# Patient Record
Sex: FEMALE | Race: WHITE | HISPANIC OR LATINO | Employment: PART TIME | URBAN - METROPOLITAN AREA
[De-identification: names, ages, dates, MRNs, and addresses within clinical notes are randomized per-mention and may not be internally consistent; named-entity substitution may affect disease eponyms.]

---

## 2019-08-26 ENCOUNTER — HOSPITAL ENCOUNTER (EMERGENCY)
Facility: HOSPITAL | Age: 34
Discharge: HOME | End: 2019-08-26
Attending: EMERGENCY MEDICINE | Admitting: EMERGENCY MEDICINE
Payer: COMMERCIAL

## 2019-08-26 ENCOUNTER — APPOINTMENT (EMERGENCY)
Dept: CT IMAGING | Facility: HOSPITAL | Age: 34
End: 2019-08-26
Payer: COMMERCIAL

## 2019-08-26 ENCOUNTER — APPOINTMENT (EMERGENCY)
Dept: RADIOLOGY | Facility: HOSPITAL | Age: 34
End: 2019-08-26
Payer: COMMERCIAL

## 2019-08-26 VITALS
BODY MASS INDEX: 39.93 KG/M2 | HEART RATE: 81 BPM | SYSTOLIC BLOOD PRESSURE: 114 MMHG | DIASTOLIC BLOOD PRESSURE: 62 MMHG | WEIGHT: 217 LBS | TEMPERATURE: 99.2 F | OXYGEN SATURATION: 97 % | RESPIRATION RATE: 16 BRPM | HEIGHT: 62 IN

## 2019-08-26 DIAGNOSIS — M54.12 CERVICAL RADICULOPATHY: ICD-10-CM

## 2019-08-26 DIAGNOSIS — S09.90XA CLOSED HEAD INJURY, INITIAL ENCOUNTER: Primary | ICD-10-CM

## 2019-08-26 DIAGNOSIS — S16.1XXA STRAIN OF NECK MUSCLE, INITIAL ENCOUNTER: ICD-10-CM

## 2019-08-26 PROCEDURE — 71046 X-RAY EXAM CHEST 2 VIEWS: CPT

## 2019-08-26 PROCEDURE — 72125 CT NECK SPINE W/O DYE: CPT

## 2019-08-26 PROCEDURE — 93005 ELECTROCARDIOGRAM TRACING: CPT

## 2019-08-26 PROCEDURE — 99283 EMERGENCY DEPT VISIT LOW MDM: CPT | Performed by: NURSE PRACTITIONER

## 2019-08-26 PROCEDURE — 99284 EMERGENCY DEPT VISIT MOD MDM: CPT

## 2019-08-26 RX ORDER — CYCLOBENZAPRINE HCL 10 MG
10 TABLET ORAL 3 TIMES DAILY PRN
Qty: 30 TABLET | Refills: 0 | Status: SHIPPED | OUTPATIENT
Start: 2019-08-26 | End: 2019-08-26 | Stop reason: SDUPTHER

## 2019-08-26 RX ORDER — METHYLPREDNISOLONE 4 MG/1
TABLET ORAL
Qty: 21 TABLET | Refills: 0 | Status: SHIPPED | OUTPATIENT
Start: 2019-08-26 | End: 2021-02-04

## 2019-08-26 RX ORDER — CYCLOBENZAPRINE HCL 10 MG
10 TABLET ORAL 3 TIMES DAILY PRN
Qty: 21 TABLET | Refills: 0 | Status: SHIPPED | OUTPATIENT
Start: 2019-08-26 | End: 2019-09-02

## 2019-08-26 RX ORDER — METHYLPREDNISOLONE 4 MG/1
TABLET ORAL
Qty: 21 TABLET | Refills: 0 | Status: SHIPPED | OUTPATIENT
Start: 2019-08-26 | End: 2019-08-26 | Stop reason: SDUPTHER

## 2019-08-26 NOTE — ED PROVIDER NOTES
History  Chief Complaint   Patient presents with   Trip Ceron Fall     fell down some stairs few days ago, c/o chest pain     53-year-old female presents here after having 2 days of neck pain after a fall  She reports she tripped on the steps and dove over a box she was carrying striking her head at the bottom of the stairway putting hole in the wall  This was an axial load injury  She has tenderness over the left trapezius but no focal midline cervical tenderness  She has some left upper extremity radiculopathy  She reports she may have lost consciousness at the time  She has ecchymosis noted over the left forehead area with some gravitational pooling underneath the left eye  She denies any headache no nausea no vomiting  Denies any dizziness  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  History provided by:  Parent  Fall   Associated symptoms: neck pain    Associated symptoms: no abdominal pain, no headaches, no nausea and no vomiting        None       History reviewed  No pertinent past medical history  Past Surgical History:   Procedure Laterality Date    CHOLECYSTECTOMY         History reviewed  No pertinent family history  I have reviewed and agree with the history as documented  Social History     Tobacco Use    Smoking status: Never Smoker    Smokeless tobacco: Never Used   Substance Use Topics    Alcohol use: Never     Frequency: Never    Drug use: Never        Review of Systems   Constitutional: Negative for activity change, fatigue and fever  HENT: Negative for congestion, ear pain, rhinorrhea and sore throat  Eyes: Negative  Respiratory: Negative for cough, shortness of breath and wheezing  Gastrointestinal: Negative for abdominal pain, diarrhea, nausea and vomiting  Endocrine: Negative      Genitourinary: Negative for difficulty urinating, dyspareunia, dysuria, flank pain, frequency, menstrual problem, pelvic pain, urgency, vaginal bleeding, vaginal discharge and vaginal pain  Musculoskeletal: Positive for neck pain  Negative for arthralgias and myalgias  Skin: Negative for color change and pallor  Neurological: Negative for dizziness, speech difficulty, weakness and headaches  Hematological: Negative for adenopathy  Psychiatric/Behavioral: Negative for confusion  Physical Exam  Physical Exam   Constitutional: She is oriented to person, place, and time  She appears well-developed and well-nourished  She is cooperative  Non-toxic appearance  She does not have a sickly appearance  She does not appear ill  No distress  HENT:   Head: Normocephalic and atraumatic  Right Ear: Tympanic membrane and external ear normal    Left Ear: Tympanic membrane and external ear normal    Nose: No rhinorrhea, sinus tenderness or nasal deformity  No epistaxis  Right sinus exhibits no maxillary sinus tenderness and no frontal sinus tenderness  Left sinus exhibits no maxillary sinus tenderness and no frontal sinus tenderness  Mouth/Throat: Oropharynx is clear and moist and mucous membranes are normal  Normal dentition  Eyes: Pupils are equal, round, and reactive to light  EOM are normal    Neck: Normal range of motion  Neck supple  Cardiovascular: Normal rate, regular rhythm and normal heart sounds  No murmur heard  Pulmonary/Chest: Effort normal and breath sounds normal  No accessory muscle usage  No respiratory distress  She has no wheezes  She has no rales  She exhibits no tenderness  Abdominal: Soft  She exhibits no distension  There is no guarding  Musculoskeletal: Normal range of motion  She exhibits no edema or tenderness  Lymphadenopathy:     She has no cervical adenopathy  Neurological: She is alert and oriented to person, place, and time  She exhibits normal muscle tone  Skin: Skin is warm and dry  No rash noted  No erythema  Psychiatric: She has a normal mood and affect     Nursing note and vitals reviewed  Vital Signs  ED Triage Vitals   Temperature Pulse Respirations Blood Pressure SpO2   08/26/19 1325 08/26/19 1325 08/26/19 1325 08/26/19 1327 08/26/19 1415   99 2 °F (37 3 °C) 95 18 112/77 98 %      Temp src Heart Rate Source Patient Position - Orthostatic VS BP Location FiO2 (%)   -- 08/26/19 1415 08/26/19 1415 08/26/19 1415 --    Monitor Lying Right arm       Pain Score       08/26/19 1325       Worst Possible Pain           Vitals:    08/26/19 1415 08/26/19 1515 08/26/19 1530 08/26/19 1615   BP: 126/73 113/59 113/59 114/62   Pulse: 68 78 78 81   Patient Position - Orthostatic VS: Lying Lying Lying Lying         Visual Acuity      ED Medications  Medications - No data to display    Diagnostic Studies  Results Reviewed     None                 XR chest 2 views   Final Result by Andressa Wilson MD (08/26 1626)      No acute cardiopulmonary disease  Workstation performed: [id number]         CT cervical spine without contrast   Final Result by Robina Irene MD (08/26 1550)      No cervical spine fracture or traumatic malalignment                     Workstation performed: [id number]                    Procedures  Procedures       ED Course                               MDM  Number of Diagnoses or Management Options  Cervical radiculopathy: new and requires workup  Closed head injury, initial encounter: new and requires workup  Strain of neck muscle, initial encounter: new and requires workup     Amount and/or Complexity of Data Reviewed  Tests in the radiology section of CPT®: reviewed and ordered  Independent visualization of images, tracings, or specimens: yes    Patient Progress  Patient progress: stable      Disposition  Final diagnoses:   Closed head injury, initial encounter   Strain of neck muscle, initial encounter   Cervical radiculopathy     Time reflects when diagnosis was documented in both MDM as applicable and the Disposition within this note     Time User Action Codes Description Comment    8/26/2019  3:35 PM Mamadou Thomas [S09 90XA] Closed head injury, initial encounter     8/26/2019  3:35 PM Mamadou Thomas [S16  1XXA] Strain of neck muscle, initial encounter     8/26/2019  4:30 PM Mamadou Thomas [Q95 94] Cervical radiculopathy       ED Disposition     ED Disposition Condition Date/Time Comment    Discharge Stable Mon Aug 26, 2019  3:35 PM [facility] discharge to home/self care              Follow-up Information     Follow up With Specialties Details Why Contact Info    [facility] Physical Therapy Schedule an appointment as soon as possible for a visit  For Continued Evaluation [phone]          Discharge Medication List as of 8/26/2019  4:32 PM      START taking these medications    Details   cyclobenzaprine (FLEXERIL) 10 mg tablet Take 1 tablet (10 mg total) by mouth 3 (three) times a day as needed for muscle spasms for up to 7 days, Starting Mon 8/26/2019, Until Mon 9/2/2019, Print      methylPREDNISolone (MEDROL) 4 MG tablet therapy pack Use as directed on package, Print               ED Provider  Electronically Signed by           SON Oropeza  08/26/19 0625

## 2019-08-27 LAB
ATRIAL RATE: 70 BPM
P AXIS: 23 DEGREES
PR INTERVAL: 126 MS
QRS AXIS: 14 DEGREES
QRSD INTERVAL: 78 MS
QT INTERVAL: 380 MS
QTC INTERVAL: 410 MS
T WAVE AXIS: 0 DEGREES
VENTRICULAR RATE: 70 BPM

## 2019-08-27 PROCEDURE — 93010 ELECTROCARDIOGRAM REPORT: CPT | Performed by: INTERNAL MEDICINE

## 2019-08-28 LAB
ATRIAL RATE: 68 BPM
P AXIS: 20 DEGREES
PR INTERVAL: 124 MS
QRS AXIS: 16 DEGREES
QRSD INTERVAL: 76 MS
QT INTERVAL: 382 MS
QTC INTERVAL: 406 MS
T WAVE AXIS: 4 DEGREES
VENTRICULAR RATE: 68 BPM

## 2019-08-28 PROCEDURE — 93010 ELECTROCARDIOGRAM REPORT: CPT | Performed by: INTERNAL MEDICINE

## 2019-08-29 ENCOUNTER — TELEPHONE (OUTPATIENT)
Dept: PHYSICAL THERAPY | Facility: OTHER | Age: 34
End: 2019-08-29

## 2019-08-29 NOTE — TELEPHONE ENCOUNTER
Voice mail/message left for patient to return call to [place] [id number] program including our hours of business and phone number       Referral closed

## 2021-02-04 ENCOUNTER — PREP FOR PROCEDURE (OUTPATIENT)
Dept: GASTROENTEROLOGY | Facility: CLINIC | Age: 36
End: 2021-02-04

## 2021-02-04 ENCOUNTER — OFFICE VISIT (OUTPATIENT)
Dept: GASTROENTEROLOGY | Facility: CLINIC | Age: 36
End: 2021-02-04
Payer: COMMERCIAL

## 2021-02-04 VITALS
SYSTOLIC BLOOD PRESSURE: 120 MMHG | BODY MASS INDEX: 42.36 KG/M2 | DIASTOLIC BLOOD PRESSURE: 88 MMHG | HEIGHT: 62 IN | HEART RATE: 77 BPM | WEIGHT: 230.2 LBS

## 2021-02-04 DIAGNOSIS — R11.2 NAUSEA AND VOMITING, INTRACTABILITY OF VOMITING NOT SPECIFIED, UNSPECIFIED VOMITING TYPE: Primary | ICD-10-CM

## 2021-02-04 DIAGNOSIS — E66.9 OBESITY, UNSPECIFIED CLASSIFICATION, UNSPECIFIED OBESITY TYPE, UNSPECIFIED WHETHER SERIOUS COMORBIDITY PRESENT: Primary | ICD-10-CM

## 2021-02-04 DIAGNOSIS — R11.2 NAUSEA AND VOMITING, INTRACTABILITY OF VOMITING NOT SPECIFIED, UNSPECIFIED VOMITING TYPE: ICD-10-CM

## 2021-02-04 PROCEDURE — 99203 OFFICE O/P NEW LOW 30 MIN: CPT | Performed by: PHYSICIAN ASSISTANT

## 2021-02-04 RX ORDER — LEVOCETIRIZINE DIHYDROCHLORIDE 5 MG/1
5 TABLET, FILM COATED ORAL
COMMUNITY
Start: 2020-10-06 | End: 2021-10-06

## 2021-02-04 NOTE — LETTER
February 4, 2021     Jacquelin Ellis DO  [address] [facility] [address] [facility]  [address]    Patient: Joseph Irizarry   YOB: 1985   Date of Visit: 2/4/2021       Dear Dr Mendoza So: Thank you for referring Talha Barlow to me for evaluation  Below are my notes for this consultation  If you have questions, please do not hesitate to call me  I look forward to following your patient along with you  Sincerely,        Kimberly Plata PA-C        CC: No Recipients  Henry Cotto Daily  2/4/2021 10:13 AM  Sign when Signing Visit  [facility] - Outpatient Consultation  Joseph Irizarry 31 y o  female MRN: [id number]  Encounter: [id number]          ASSESSMENT AND PLAN:      1  Obesity, unspecified classification, unspecified obesity type, unspecified whether serious comorbidity present  2  Nausea and vomiting, intractability of vomiting not specified, unspecified vomiting type  -Will plan EGD with biopsy  ______________________________________________________________________    HPI:    31year old female who is here for an EGD prior to gastric sleeve  Patient reports she has never had an EGD in the past  She does report occasional nausea and vomiting but she thinks this is likely due to her asthma medications  Patient denies any abdominal pain, diarrhea, constipation  She denies any melena or RB  REVIEW OF SYSTEMS:    CONSTITUTIONAL: Denies any fever, chills, rigors, and weight loss  HEENT: No earache or tinnitus  Denies hearing loss or visual disturbances  CARDIOVASCULAR: No chest pain or palpitations  RESPIRATORY: Denies any cough, hemoptysis, shortness of breath or dyspnea on exertion  GASTROINTESTINAL: As noted in the History of Present Illness  GENITOURINARY: No problems with urination  Denies any hematuria or dysuria  NEUROLOGIC: No dizziness or vertigo, denies headaches  MUSCULOSKELETAL: Denies any muscle or joint pain     SKIN: Denies skin rashes or itching  ENDOCRINE: Denies excessive thirst  Denies intolerance to heat or cold  PSYCHOSOCIAL: Denies depression or anxiety  Denies any recent memory loss  Historical Information   History reviewed  No pertinent past medical history  Past Surgical History:   Procedure Laterality Date    CHOLECYSTECTOMY       Social History   Social History     Substance and Sexual Activity   Alcohol Use Yes    Frequency: Never    Comment: socially     Social History     Substance and Sexual Activity   Drug Use Never     Social History     Tobacco Use   Smoking Status Never Smoker   Smokeless Tobacco Never Used     Family History   Problem Relation Age of Onset    Heart disease Mother        Meds/Allergies       Current Outpatient Medications:     levocetirizine (XYZAL) 5 MG tablet    cyclobenzaprine (FLEXERIL) 10 mg tablet    Allergies   Allergen Reactions    Asa [Aspirin] Other (See Comments)     asthma           Objective     Blood pressure 120/88, pulse 77, height 5' 2" (1 575 m), weight 104 kg (230 lb 3 2 oz)  Body mass index is 42 1 kg/m²  PHYSICAL EXAM:      General Appearance:   Alert, cooperative, no distress   HEENT:   Normocephalic, atraumatic, anicteric      Neck:  Supple, symmetrical, trachea midline   Lungs:   Clear to auscultation bilaterally; no rales, rhonchi or wheezing; respirations unlabored    Heart[de-identified]   Regular rate and rhythm; no murmur, rub, or gallop  Abdomen:   Soft, non-tender, non-distended; normal bowel sounds; no masses, no organomegaly    Genitalia:   Deferred    Rectal:   Deferred    Extremities:  No cyanosis, clubbing or edema    Pulses:  2+ and symmetric    Skin:  No jaundice, rashes, or lesions    Lymph nodes:  No palpable cervical lymphadenopathy        Lab Results:   No visits with results within 1 Day(s) from this visit     Latest known visit with results is:   Admission on 08/26/2019, Discharged on 08/26/2019   Component Date Value    Ventricular Rate 08/26/2019 70     Atrial Rate 08/26/2019 70     [place] Interval 08/26/2019 126     QRSD Interval 08/26/2019 78     QT Interval 08/26/2019 380     QTC Interval 08/26/2019 410     P Axis 08/26/2019 23     QRS Axis 08/26/2019 14     T Wave Axis 08/26/2019 0     Ventricular Rate 08/26/2019 68     Atrial Rate 08/26/2019 68     [place] Interval 08/26/2019 124     QRSD Interval 08/26/2019 76     QT Interval 08/26/2019 382     QTC Interval 08/26/2019 406     P Axis 08/26/2019 20     QRS Axis 08/26/2019 16     T Wave Axis 08/26/2019 4          Radiology Results:   No results found

## 2021-02-04 NOTE — PROGRESS NOTES
[facility] - Outpatient Consultation  Kathleen Navarro 31 y o  female MRN: [id number]  Encounter: [id number]          ASSESSMENT AND PLAN:      1  Obesity, unspecified classification, unspecified obesity type, unspecified whether serious comorbidity present  2  Nausea and vomiting, intractability of vomiting not specified, unspecified vomiting type  -Will plan EGD with biopsy  ______________________________________________________________________    HPI:    31year old female who is here for an EGD prior to gastric sleeve  Patient reports she has never had an EGD in the past  She does report occasional nausea and vomiting but she thinks this is likely due to her asthma medications  Patient denies any abdominal pain, diarrhea, constipation  She denies any melena or RB  REVIEW OF SYSTEMS:    CONSTITUTIONAL: Denies any fever, chills, rigors, and weight loss  HEENT: No earache or tinnitus  Denies hearing loss or visual disturbances  CARDIOVASCULAR: No chest pain or palpitations  RESPIRATORY: Denies any cough, hemoptysis, shortness of breath or dyspnea on exertion  GASTROINTESTINAL: As noted in the History of Present Illness  GENITOURINARY: No problems with urination  Denies any hematuria or dysuria  NEUROLOGIC: No dizziness or vertigo, denies headaches  MUSCULOSKELETAL: Denies any muscle or joint pain  SKIN: Denies skin rashes or itching  ENDOCRINE: Denies excessive thirst  Denies intolerance to heat or cold  PSYCHOSOCIAL: Denies depression or anxiety  Denies any recent memory loss  Historical Information   History reviewed  No pertinent past medical history    Past Surgical History:   Procedure Laterality Date    CHOLECYSTECTOMY       Social History   Social History     Substance and Sexual Activity   Alcohol Use Yes    Frequency: Never    Comment: socially     Social History     Substance and Sexual Activity   Drug Use Never     Social History Tobacco Use   Smoking Status Never Smoker   Smokeless Tobacco Never Used     Family History   Problem Relation Age of Onset    Heart disease Mother        Meds/Allergies       Current Outpatient Medications:     levocetirizine (XYZAL) 5 MG tablet    cyclobenzaprine (FLEXERIL) 10 mg tablet    Allergies   Allergen Reactions    Asa [Aspirin] Other (See Comments)     asthma           Objective     Blood pressure 120/88, pulse 77, height 5' 2" (1 575 m), weight 104 kg (230 lb 3 2 oz)  Body mass index is 42 1 kg/m²  PHYSICAL EXAM:      General Appearance:   Alert, cooperative, no distress   HEENT:   Normocephalic, atraumatic, anicteric      Neck:  Supple, symmetrical, trachea midline   Lungs:   Clear to auscultation bilaterally; no rales, rhonchi or wheezing; respirations unlabored    Heart[de-identified]   Regular rate and rhythm; no murmur, rub, or gallop  Abdomen:   Soft, non-tender, non-distended; normal bowel sounds; no masses, no organomegaly    Genitalia:   Deferred    Rectal:   Deferred    Extremities:  No cyanosis, clubbing or edema    Pulses:  2+ and symmetric    Skin:  No jaundice, rashes, or lesions    Lymph nodes:  No palpable cervical lymphadenopathy        Lab Results:   No visits with results within 1 Day(s) from this visit     Latest known visit with results is:   Admission on 08/26/2019, Discharged on 08/26/2019   Component Date Value    Ventricular Rate 08/26/2019 70     Atrial Rate 08/26/2019 70     [place] Interval 08/26/2019 126     QRSD Interval 08/26/2019 78     QT Interval 08/26/2019 380     QTC Interval 08/26/2019 410     P Axis 08/26/2019 23     QRS Axis 08/26/2019 14     T Wave Axis 08/26/2019 0     Ventricular Rate 08/26/2019 68     Atrial Rate 08/26/2019 68     [place] Interval 08/26/2019 124     QRSD Interval 08/26/2019 76     QT Interval 08/26/2019 382     QTC Interval 08/26/2019 406     P Axis 08/26/2019 20     QRS Axis 08/26/2019 16     T Wave Axis 08/26/2019 4 Radiology Results:   No results found

## 2021-02-04 NOTE — PATIENT INSTRUCTIONS
Obesity   WHAT YOU SHOULD KNOW:   Obesity is a medical condition caused by too much body fat  Your caregiver will use your height and weight to calculate your body mass index (BMI)  You are obese if your BMI is greater than 30  Obesity increases your risk of medical conditions such as diabetes, heart disease, and certain types of cancer  Obesity is treated with lifestyle changes, and sometimes medicine or surgery  The goal of treatment is to help you lose weight so your health will improve  Even a small decrease in BMI can reduce the risk of health problems caused by obesity  INSTRUCTIONS:   Follow up with your primary healthcare provider [facility]) as directed:  Ask your PHP to help you set a weight loss goal  Keep appointments with your PHP so that he can support you as you lose weight  Write down your questions so you remember to ask them during your visits  How to be successful in losing weight:   · Set small, realistic goals  An example of a small goal is to walk for 20 minutes 5 days a week  Do not try to change everything at once  · Tell friends, family members, and coworkers about your goals  and ask for their support  Ask a friend to lose weight with you, or join a weight-loss support group  · Identify foods or triggers that may cause you to overeat , and find ways to avoid them  Remove tempting high-calorie foods from your home and workplace  Place a bowl of fresh fruit on your kitchen counter  If stress causes you to eat, then find other ways to cope with stress  · Keep a diary to track what you eat and drink, and your daily calorie intake  Also write down how many minutes of physical activity you do each day  Weigh yourself once a week and record it in your diary  Eating changes: You will need to eat 500 to 1000 fewer calories each day than you currently eat to lose 1 to 2 pounds a week  The following changes will help you cut calories:  · Eat smaller portions    Use small plates, no larger than 9 inches in diameter  Fill your plate half full of fruits and vegetables  Measure your food using measuring cups until you know what a serving size looks like  · Eat 3 meals and 1 or 2 snacks each day  Plan your meals in advance  Daniela Alicea and eat at home most of the time  Eat slowly  · Eat fruits and vegetables at every meal   They are low in calories and high in fiber, which makes you feel full  Do not add butter, margarine, or cream sauce to vegetables  Use herbs to season steamed vegetables  · Eat less fat and fewer fried foods  Eat more baked or grilled chicken and fish  These protein sources are lower in calories and fat than red meat  Limit fast food  Dress your salads with olive oil and vinegar instead of bottled dressing  · Limit the amount of sugar you eat  Do not drink sugary beverages  Limit alcohol  Activity changes:  Physical activity is good for your body in many ways  It helps you burn calories and build strong muscles  It decreases stress and depression, and gives you an overall sense of well-being  It can also help you sleep better  Talk to your PHP before you begin an exercise program   · Exercise for at least 30 minutes 5 days a week  Start slowly  Set aside time each day for physical activity that you enjoy and that is convenient for you  It is best to do both weight training and an activity that increases your heart rate, such as walking, bicycling, or swimming  · Find ways to be more active  Do yard work and housecleaning  Walk up the stairs instead of using elevators  Spend your leisure time going to events that require walking, such as outdoor festivals and art fairs  This extra physical activity can help you lose weight and keep it off  Contact your PHP if:   · You have symptoms of gallbladder or liver disease, such as pain in your upper abdomen  · You have knee or hip pain and discomfort while walking       · You have symptoms of diabetes, such as intense hunger and thirst, and frequent urination  · You have symptoms of sleep apnea, such as snoring or daytime sleepiness  · You have questions or concerns about your condition or care  Return to the emergency department if:   · You have a severe headache, confusion, or difficulty speaking  · You have weakness on one side of your body  · You have chest pain, sweating, or shortness of breath  © 2014 [address] is for End User's use only and may not be sold, redistributed or otherwise used for commercial purposes  All illustrations and images included in CareNotes® are the copyrighted property of [employer]  or Gadiel Teixeira  The above information is an  only  It is not intended as medical advice for individual conditions or treatments  Talk to your doctor, nurse or pharmacist before following any medical regimen to see if it is safe and effective for you

## 2021-02-08 ENCOUNTER — TELEPHONE (OUTPATIENT)
Dept: GASTROENTEROLOGY | Facility: CLINIC | Age: 36
End: 2021-02-08

## 2021-02-08 NOTE — TELEPHONE ENCOUNTER
Patient called she would like to reschedule her procedure  Patient needs it  Performed -/by 02/17/21   Please call Dimitri Cain at [phone]

## 2021-02-12 ENCOUNTER — TELEPHONE (OUTPATIENT)
Dept: GASTROENTEROLOGY | Facility: CLINIC | Age: 36
End: 2021-02-12

## 2021-02-12 NOTE — TELEPHONE ENCOUNTER
Dorian-  Patient l/m She  is cancelling her EGD which is scheduled for 02/16     Dr Xiao Galicia is going to do the procedure the day of her surgery     [phone]

## 2022-07-01 ENCOUNTER — APPOINTMENT (EMERGENCY)
Dept: VASCULAR ULTRASOUND | Facility: HOSPITAL | Age: 37
End: 2022-07-01
Payer: COMMERCIAL

## 2022-07-01 ENCOUNTER — HOSPITAL ENCOUNTER (EMERGENCY)
Facility: HOSPITAL | Age: 37
Discharge: HOME | End: 2022-07-01
Attending: EMERGENCY MEDICINE
Payer: COMMERCIAL

## 2022-07-01 ENCOUNTER — APPOINTMENT (EMERGENCY)
Dept: RADIOLOGY | Facility: HOSPITAL | Age: 37
End: 2022-07-01
Payer: COMMERCIAL

## 2022-07-01 VITALS
HEIGHT: 62 IN | OXYGEN SATURATION: 100 % | TEMPERATURE: 99.6 F | SYSTOLIC BLOOD PRESSURE: 148 MMHG | DIASTOLIC BLOOD PRESSURE: 80 MMHG | BODY MASS INDEX: 42.33 KG/M2 | HEART RATE: 58 BPM | WEIGHT: 230 LBS | RESPIRATION RATE: 18 BRPM

## 2022-07-01 DIAGNOSIS — H65.02 NON-RECURRENT ACUTE SEROUS OTITIS MEDIA OF LEFT EAR: ICD-10-CM

## 2022-07-01 DIAGNOSIS — M71.21 SYNOVIAL CYST OF RIGHT POPLITEAL SPACE: Primary | ICD-10-CM

## 2022-07-01 LAB
FLUAV RNA RESP QL NAA+PROBE: NEGATIVE
FLUBV RNA RESP QL NAA+PROBE: NEGATIVE
RSV RNA RESP QL NAA+PROBE: NEGATIVE
SARS-COV-2 RNA RESP QL NAA+PROBE: NEGATIVE

## 2022-07-01 PROCEDURE — 73590 X-RAY EXAM OF LOWER LEG: CPT

## 2022-07-01 PROCEDURE — 0241U HB NFCT DS VIR RESP RNA 4 TRGT: CPT | Performed by: EMERGENCY MEDICINE

## 2022-07-01 PROCEDURE — 99284 EMERGENCY DEPT VISIT MOD MDM: CPT | Performed by: EMERGENCY MEDICINE

## 2022-07-01 PROCEDURE — 93971 EXTREMITY STUDY: CPT

## 2022-07-01 PROCEDURE — 93971 EXTREMITY STUDY: CPT | Performed by: SURGERY

## 2022-07-01 PROCEDURE — 99284 EMERGENCY DEPT VISIT MOD MDM: CPT

## 2022-07-01 PROCEDURE — 96372 THER/PROPH/DIAG INJ SC/IM: CPT

## 2022-07-01 RX ORDER — AMOXICILLIN AND CLAVULANATE POTASSIUM 875; 125 MG/1; MG/1
1 TABLET, FILM COATED ORAL ONCE
Status: COMPLETED | OUTPATIENT
Start: 2022-07-01 | End: 2022-07-01

## 2022-07-01 RX ORDER — KETOROLAC TROMETHAMINE 30 MG/ML
15 INJECTION, SOLUTION INTRAMUSCULAR; INTRAVENOUS ONCE
Status: COMPLETED | OUTPATIENT
Start: 2022-07-01 | End: 2022-07-01

## 2022-07-01 RX ORDER — LIDOCAINE 50 MG/G
1 PATCH TOPICAL ONCE
Status: DISCONTINUED | OUTPATIENT
Start: 2022-07-01 | End: 2022-07-01 | Stop reason: HOSPADM

## 2022-07-01 RX ORDER — AMOXICILLIN AND CLAVULANATE POTASSIUM 875; 125 MG/1; MG/1
1 TABLET, FILM COATED ORAL EVERY 12 HOURS
Qty: 14 TABLET | Refills: 0 | Status: SHIPPED | OUTPATIENT
Start: 2022-07-01 | End: 2022-07-08

## 2022-07-01 RX ADMIN — KETOROLAC TROMETHAMINE 15 MG: 30 INJECTION, SOLUTION INTRAMUSCULAR at 14:29

## 2022-07-01 RX ADMIN — LIDOCAINE 5% 1 PATCH: 700 PATCH TOPICAL at 14:29

## 2022-07-01 RX ADMIN — AMOXICILLIN AND CLAVULANATE POTASSIUM 1 TABLET: 875; 125 TABLET, FILM COATED ORAL at 15:56

## 2022-07-01 NOTE — Clinical Note
Nikita Mariees was seen and treated in our emergency department on 7/1/2022  Diagnosis:     Jackie Jain  may return to work on return date  She may return on this date: 07/04/2022         If you have any questions or concerns, please don't hesitate to call        Kisha Walters, DO    ______________________________           _______________          _______________  Hospital Representative                              Date                                Time

## 2022-07-01 NOTE — DISCHARGE INSTRUCTIONS
Take tylenol and ibuprofen  Apply compression  Follow up with sports medicine  Take antibiotics as prescribed

## 2022-07-01 NOTE — Clinical Note
Cisco Dancer was seen and treated in our emergency department on 7/1/2022  Diagnosis:     Mir Torres  may return to work on return date  She may return on this date: 07/04/2022         If you have any questions or concerns, please don't hesitate to call        Jania Morillo DO    ______________________________           _______________          _______________  Hospital Representative                              Date                                Time

## 2022-07-01 NOTE — ED PROVIDER NOTES
Pt Name: Maddy Razo  MRN: [id number]  [place] 1985  Age/Sex: 30 y o  female  Date of evaluation: 7/1/2022  PCP: Linda Jeffery, [address] [place] [address]    Chief Complaint   Patient presents with    Leg Pain     Patient co right leg pain x 3 days  Patient states "I accidentally hit it on a speaker when I woke up in the middle of the night "          HPI and MDM    30 y o  female presenting with right leg pain  Patient states she hit her right leg on a speaker a little over a week ago  Since then she has had pain and bruise right below her right knee  Also mentions getting some numbness in her foot, however has chronic lower back pain that radiates down her right side  That is not any worse  No weakness  She also states for the last 2 days she has had left ear pain, and today started having a sore throat and nasal congestion  No known sick contacts, however works as a   No red flag signs for back pain  No focal neurological deficits  Negative for flu, COVID and RSV  No fracture dislocation on x-ray of tibia/fibula  Ultrasound revealing Baker cyst   Given a dose of antibiotics for acute otitis media  Updated with results  Pain improved upon re-evaluation  Advised supportive care, advised PCP and sports medicine follow-up, return precautions discussed, patient verbalized understanding and is in agreement with plan  Medications   lidocaine (LIDODERM) 5 % patch 1 patch (1 patch Topical Medication Applied 7/1/22 8979)   ketorolac (TORADOL) injection 15 mg (15 mg Intramuscular Given 7/1/22 1429)   amoxicillin-clavulanate (AUGMENTIN) 875-125 mg per tablet 1 tablet (1 tablet Oral Given 7/1/22 9934)         Past Medical and Surgical History    No past medical history on file      Past Surgical History:   Procedure Laterality Date    CHOLECYSTECTOMY         Family History   Problem Relation Age of Onset    Heart disease Mother        Social History     Tobacco Use    Smoking status: Never Smoker    Smokeless tobacco: Never Used   Vaping Use    Vaping Use: Never used   Substance Use Topics    Alcohol use: Yes     Comment: socially    Drug use: Never           Allergies    Allergies   Allergen Reactions    Asa [Aspirin] Other (See Comments)     asthma       Home Medications    Prior to Admission medications    Medication Sig Start Date End Date Taking? Authorizing Provider   cyclobenzaprine (FLEXERIL) 10 mg tablet Take 1 tablet (10 mg total) by mouth 3 (three) times a day as needed for muscle spasms for up to 7 days 8/26/19 9/2/19  SON Lee   levocetirizine (XYZAL) 5 MG tablet Take 5 mg by mouth 10/6/20 10/6/21  Historical Provider, MD           Review of Systems    Review of Systems   Constitutional: Negative for chills and fever  HENT: Positive for congestion, ear pain and sore throat  Eyes: Negative for pain and visual disturbance  Respiratory: Negative for cough and shortness of breath  Cardiovascular: Negative for chest pain and leg swelling  Gastrointestinal: Negative for abdominal pain, nausea and vomiting  Genitourinary: Negative for dysuria and hematuria  Musculoskeletal: Positive for back pain  Right leg pain   Skin: Positive for color change  Negative for rash  Neurological: Negative for syncope and headaches  Physical Exam      ED Triage Vitals   Temperature Pulse Respirations Blood Pressure SpO2   07/01/22 1252 07/01/22 1252 07/01/22 1252 07/01/22 1252 07/01/22 1252   99 6 °F (37 6 °C) 77 18 163/79 100 %      Temp Source Heart Rate Source Patient Position - Orthostatic VS BP Location FiO2 (%)   07/01/22 1252 07/01/22 1252 07/01/22 1252 07/01/22 1252 --   Oral Monitor Sitting Left arm       Pain Score       07/01/22 1429       9               Physical Exam  Constitutional:       Appearance: She is not ill-appearing  HENT:      Head: Normocephalic and atraumatic        Right Ear: External ear normal       Left Ear: External ear normal       Ears:      Comments: Left TM erythematous with mild bulging     Nose: Nose normal       Mouth/Throat:      Mouth: Mucous membranes are moist    Eyes:      Extraocular Movements: Extraocular movements intact  Pupils: Pupils are equal, round, and reactive to light  Cardiovascular:      Rate and Rhythm: Normal rate and regular rhythm  Pulmonary:      Effort: Pulmonary effort is normal  No respiratory distress  Abdominal:      General: There is no distension  Palpations: Abdomen is soft  Tenderness: There is no abdominal tenderness  Musculoskeletal:         General: Normal range of motion  Cervical back: Normal range of motion  No rigidity  Comments: No midline spinal tenderness or step-offs, mild right lower para-spinal musculature ttp   Skin:     General: Skin is warm  Comments: Bruising below right knee   Neurological:      Mental Status: She is alert and oriented to person, place, and time  Mental status is at baseline  Cranial Nerves: No cranial nerve deficit  Motor: No weakness  Comments: No objective numbness on exam              Diagnostic Results      Labs:    Results Reviewed     Procedure Component Value Units Date/Time    COVID/FLU/RSV - 2 hour TAT [[id number]]  (Normal) Collected: 07/01/22 1429    Lab Status: Final result Specimen: Nares from Nose Updated: 07/01/22 1522     SARS-CoV-2 Negative     INFLUENZA A PCR Negative     INFLUENZA B PCR Negative     RSV PCR Negative    Narrative:      FOR PEDIATRIC PATIENTS - copy/paste COVID Guidelines URL to browser: [email or web address]  [employer]    SARS-CoV-2 assay is a Nucleic Acid Amplification assay intended for the  qualitative detection of nucleic acid from SARS-CoV-2 in nasopharyngeal  swabs  Results are for the presumptive identification of SARS-CoV-2 RNA      Positive results are indicative of infection with SARS-CoV-2, the virus  causing COVID-19, but do not rule out bacterial infection or co-infection  with other viruses  Laboratories within the [place] and its  territories are required to report all positive results to the appropriate  public health authorities  Negative results do not preclude SARS-CoV-2  infection and should not be used as the sole basis for treatment or other  patient management decisions  Negative results must be combined with  clinical observations, patient history, and epidemiological information  This test has not been FDA cleared or approved  This test has been authorized by FDA under an Emergency Use Authorization  (EUA)  This test is only authorized for the duration of time the  declaration that circumstances exist justifying the authorization of the  emergency use of an in vitro diagnostic tests for detection of SARS-CoV-2  virus and/or diagnosis of COVID-19 infection under section 564(b)(1) of  the Act, 21 U  S C  089ENJ-0(K)(1), unless the authorization is terminated  or revoked sooner  The test has been validated but independent review by FDA  and CLIA is pending  Test performed using [employer] GeneXpert: This RT-PCR assay targets N2,  a region unique to SARS-CoV-2  A conserved region in the E-gene was chosen  for pan-Sarbecovirus detection which includes SARS-CoV-2  All labs reviewed and utilized in the medical decision making process    Radiology:    XR tibia fibula 2 views RIGHT   Final Result      No acute osseous abnormality              Workstation performed: [id number]         VAS lower limb venous duplex study, unilateral/limited    (Results Pending)       All radiology studies independently viewed by me and interpreted by the radiologist     Procedure    Procedures        FINAL IMPRESSION    Final diagnoses:   Synovial cyst of right popliteal space   Non-recurrent acute serous otitis media of left ear         DISPOSITION    Time reflects when diagnosis was documented in both MDM as applicable and the Disposition within this note     Time User Action Codes Description Comment    7/1/2022  4:10 PM Kaveh Dia Add [M71 21] Synovial cyst of right popliteal space     7/1/2022  4:10 PM Kaveh Dia Add [H65 02] Non-recurrent acute serous otitis media of left ear       ED Disposition     ED Disposition   Discharge    Condition   Stable    Date/Time   Fri Jul 1, 2022  4:09 PM    Comment   [facility] discharge to home/self care                 Follow-up Information     Follow up With Specialties Details Why [facility],  Family Medicine Schedule an appointment as soon as possible for a visit in 1 week  [address] [address]  [address] Schedule an appointment as soon as possible for a visit in 1 week  [address] [facility]  [address]  [facility] [address]  [phone]              PATIENT REFERRED TO:    Shonda Bowles DO  [address] [facility] [address] [facility] [address]  [phone]    Schedule an appointment as soon as possible for a visit in 1 week      Bakari Ashby DO  [address] [facility]  [address]  [facility] [phone]    Schedule an appointment as soon as possible for a visit in 1 week        DISCHARGE MEDICATIONS:    Discharge Medication List as of 7/1/2022  4:13 PM      START taking these medications    Details   amoxicillin-clavulanate (AUGMENTIN) 875-125 mg per tablet Take 1 tablet by mouth every 12 (twelve) hours for 7 days, Starting Fri 7/1/2022, Until Fri 7/8/2022, Normal         CONTINUE these medications which have NOT CHANGED    Details   cyclobenzaprine (FLEXERIL) 10 mg tablet Take 1 tablet (10 mg total) by mouth 3 (three) times a day as needed for muscle spasms for up to 7 days, Starting Mon 8/26/2019, Until Mon 9/2/2019, Print      levocetirizine (XYZAL) 5 MG tablet Take 5 mg by mouth, Starting Tue 10/6/2020, Until Wed 10/6/2021, Historical Med             No discharge procedures on file          Indu Morris DO        This note was partially completed using voice recognition technology, and was scanned for gross errors; however some errors may still exist  Please contact the author with any questions or requests for clarification        Indu Morris DO  07/01/22 5261

## 2022-09-16 ENCOUNTER — HOSPITAL ENCOUNTER (EMERGENCY)
Facility: HOSPITAL | Age: 37
Discharge: HOME | End: 2022-09-16
Attending: EMERGENCY MEDICINE
Payer: COMMERCIAL

## 2022-09-16 VITALS
DIASTOLIC BLOOD PRESSURE: 89 MMHG | TEMPERATURE: 97.9 F | HEART RATE: 78 BPM | OXYGEN SATURATION: 99 % | SYSTOLIC BLOOD PRESSURE: 141 MMHG | RESPIRATION RATE: 18 BRPM

## 2022-09-16 DIAGNOSIS — U07.1 COVID-19: Primary | ICD-10-CM

## 2022-09-16 LAB
ANION GAP SERPL CALCULATED.3IONS-SCNC: 8 MMOL/L (ref 4–13)
BASOPHILS # BLD AUTO: 0.08 THOUSANDS/ΜL (ref 0–0.1)
BASOPHILS NFR BLD AUTO: 2 % (ref 0–1)
BUN SERPL-MCNC: 11 MG/DL (ref 5–25)
CALCIUM SERPL-MCNC: 9.2 MG/DL (ref 8.3–10.1)
CHLORIDE SERPL-SCNC: 99 MMOL/L (ref 96–108)
CO2 SERPL-SCNC: 28 MMOL/L (ref 21–32)
CREAT SERPL-MCNC: 0.5 MG/DL (ref 0.6–1.3)
EOSINOPHIL # BLD AUTO: 0.16 THOUSAND/ΜL (ref 0–0.61)
EOSINOPHIL NFR BLD AUTO: 3 % (ref 0–6)
ERYTHROCYTE [DISTWIDTH] IN BLOOD BY AUTOMATED COUNT: 11.7 % (ref 11.6–15.1)
GFR SERPL CREATININE-BSD FRML MDRD: 125 ML/MIN/1.73SQ M
GLUCOSE SERPL-MCNC: 80 MG/DL (ref 65–140)
HCT VFR BLD AUTO: 44.9 % (ref 34.8–46.1)
HGB BLD-MCNC: 15.5 G/DL (ref 11.5–15.4)
IMM GRANULOCYTES # BLD AUTO: 0.02 THOUSAND/UL (ref 0–0.2)
IMM GRANULOCYTES NFR BLD AUTO: 0 % (ref 0–2)
LYMPHOCYTES # BLD AUTO: 1.54 THOUSANDS/ΜL (ref 0.6–4.47)
LYMPHOCYTES NFR BLD AUTO: 32 % (ref 14–44)
MCH RBC QN AUTO: 33.8 PG (ref 26.8–34.3)
MCHC RBC AUTO-ENTMCNC: 34.5 G/DL (ref 31.4–37.4)
MCV RBC AUTO: 98 FL (ref 82–98)
MONOCYTES # BLD AUTO: 0.77 THOUSAND/ΜL (ref 0.17–1.22)
MONOCYTES NFR BLD AUTO: 16 % (ref 4–12)
NEUTROPHILS # BLD AUTO: 2.32 THOUSANDS/ΜL (ref 1.85–7.62)
NEUTS SEG NFR BLD AUTO: 47 % (ref 43–75)
NRBC BLD AUTO-RTO: 0 /100 WBCS
PLATELET # BLD AUTO: 291 THOUSANDS/UL (ref 149–390)
PMV BLD AUTO: 9.2 FL (ref 8.9–12.7)
POTASSIUM SERPL-SCNC: 4.6 MMOL/L (ref 3.5–5.3)
RBC # BLD AUTO: 4.58 MILLION/UL (ref 3.81–5.12)
SODIUM SERPL-SCNC: 135 MMOL/L (ref 135–147)
WBC # BLD AUTO: 4.89 THOUSAND/UL (ref 4.31–10.16)

## 2022-09-16 PROCEDURE — 36415 COLL VENOUS BLD VENIPUNCTURE: CPT | Performed by: PHYSICIAN ASSISTANT

## 2022-09-16 PROCEDURE — 96374 THER/PROPH/DIAG INJ IV PUSH: CPT

## 2022-09-16 PROCEDURE — 85025 COMPLETE CBC W/AUTO DIFF WBC: CPT | Performed by: PHYSICIAN ASSISTANT

## 2022-09-16 PROCEDURE — 80048 BASIC METABOLIC PNL TOTAL CA: CPT | Performed by: PHYSICIAN ASSISTANT

## 2022-09-16 PROCEDURE — 99284 EMERGENCY DEPT VISIT MOD MDM: CPT | Performed by: PHYSICIAN ASSISTANT

## 2022-09-16 PROCEDURE — 96361 HYDRATE IV INFUSION ADD-ON: CPT

## 2022-09-16 PROCEDURE — 99284 EMERGENCY DEPT VISIT MOD MDM: CPT

## 2022-09-16 RX ORDER — DIPHENHYDRAMINE HYDROCHLORIDE 50 MG/ML
25 INJECTION INTRAMUSCULAR; INTRAVENOUS ONCE
Status: DISCONTINUED | OUTPATIENT
Start: 2022-09-16 | End: 2022-09-16 | Stop reason: HOSPADM

## 2022-09-16 RX ORDER — METOCLOPRAMIDE HYDROCHLORIDE 5 MG/ML
10 INJECTION INTRAMUSCULAR; INTRAVENOUS ONCE
Status: COMPLETED | OUTPATIENT
Start: 2022-09-16 | End: 2022-09-16

## 2022-09-16 RX ORDER — PREDNISONE 20 MG/1
40 TABLET ORAL ONCE
Status: COMPLETED | OUTPATIENT
Start: 2022-09-16 | End: 2022-09-16

## 2022-09-16 RX ORDER — LIDOCAINE 50 MG/G
1 PATCH TOPICAL ONCE
Status: DISCONTINUED | OUTPATIENT
Start: 2022-09-16 | End: 2022-09-16 | Stop reason: HOSPADM

## 2022-09-16 RX ORDER — PREDNISONE 20 MG/1
40 TABLET ORAL DAILY
Qty: 8 TABLET | Refills: 0 | Status: SHIPPED | OUTPATIENT
Start: 2022-09-17 | End: 2022-09-21

## 2022-09-16 RX ORDER — ACETAMINOPHEN 325 MG/1
975 TABLET ORAL ONCE
Status: COMPLETED | OUTPATIENT
Start: 2022-09-16 | End: 2022-09-16

## 2022-09-16 RX ADMIN — METOCLOPRAMIDE 10 MG: 5 INJECTION, SOLUTION INTRAMUSCULAR; INTRAVENOUS at 17:05

## 2022-09-16 RX ADMIN — LIDOCAINE 5% 1 PATCH: 700 PATCH TOPICAL at 17:11

## 2022-09-16 RX ADMIN — SODIUM CHLORIDE 1000 ML: 0.9 INJECTION, SOLUTION INTRAVENOUS at 17:15

## 2022-09-16 RX ADMIN — PREDNISONE 40 MG: 20 TABLET ORAL at 18:11

## 2022-09-16 RX ADMIN — ACETAMINOPHEN 975 MG: 325 TABLET ORAL at 17:14

## 2022-09-16 NOTE — DISCHARGE INSTRUCTIONS
Remain in quarantine for 5 days  Drink plenty of fluids and rest  Take prednisone as prescribed  Take Tylenol as needed for fevers/aches  Please follow-up with your family doctor  Return to the ER with any worsening symptoms

## 2022-09-16 NOTE — ED PROVIDER NOTES
History  Chief Complaint   Patient presents with    Diarrhea     Pt reports vomiting, diarrhea and headaches this week  Cough and positive covid test at home  33yo female with a history of asthma presenting for evaluation of flu-like symptoms for the past several days  She has multiple symptoms including fatigue, nausea, vomiting, diarrhea, cough, and headaches  She has been using her inhaler more frequently  Patient checked a home COVID test which was positive  Patient was in senior living for 3 days this past week for a DUI and believes she contracted COVID while in senior living  Of note, patient was seen at [facility] ED 1 week ago for right sided abdominal pain  History provided by:  Patient   used: No    Flu Symptoms  Presenting symptoms: cough, diarrhea, fatigue, headache, myalgias, nausea and vomiting    Presenting symptoms: no fever and no shortness of breath    Severity:  Moderate  Onset quality:  Gradual  Progression:  Worsening  Chronicity:  New  Relieved by:  Nothing  Worsened by:  Nothing  Associated symptoms: chills    Associated symptoms: no ear pain, no mental status change, no neck stiffness and no syncope    Risk factors: no immunocompromised state        Prior to Admission Medications   Prescriptions Last Dose Informant Patient Reported? Taking? cyclobenzaprine (FLEXERIL) 10 mg tablet   No No   Sig: Take 1 tablet (10 mg total) by mouth 3 (three) times a day as needed for muscle spasms for up to 7 days   levocetirizine (XYZAL) 5 MG tablet  Self Yes No   Sig: Take 5 mg by mouth      Facility-Administered Medications: None       Past Medical History:   Diagnosis Date    Asthma     Sleep apnea        Past Surgical History:   Procedure Laterality Date    CHOLECYSTECTOMY         Family History   Problem Relation Age of Onset    Heart disease Mother      I have reviewed and agree with the history as documented      E-Cigarette/Vaping    E-Cigarette Use Never User      E-Cigarette/Vaping Substances    Nicotine No     THC No     CBD No     Flavoring No     Other No     Unknown No      Social History     Tobacco Use    Smoking status: Never Smoker    Smokeless tobacco: Never Used   Vaping Use    Vaping Use: Never used   Substance Use Topics    Alcohol use: Yes     Comment: socially    Drug use: Never       Review of Systems   Constitutional: Positive for chills and fatigue  Negative for fever  HENT: Negative for drooling, ear pain and voice change  Eyes: Negative for discharge and redness  Respiratory: Positive for cough and wheezing  Negative for shortness of breath and stridor  Cardiovascular: Negative for chest pain and leg swelling  Gastrointestinal: Positive for diarrhea, nausea and vomiting  Musculoskeletal: Positive for myalgias  Negative for neck pain and neck stiffness  Skin: Negative for color change and rash  Neurological: Positive for headaches  Negative for seizures and syncope  Psychiatric/Behavioral: Negative for confusion  The patient is not nervous/anxious  All other systems reviewed and are negative  Physical Exam  Physical Exam  Vitals and nursing note reviewed  Constitutional:       General: She is not in acute distress  Appearance: She is well-developed  She is not diaphoretic  Comments: Non-toxic appearing   HENT:      Head: Normocephalic and atraumatic  Right Ear: External ear normal       Left Ear: External ear normal       Nose: Nose normal    Eyes:      General: No scleral icterus  Right eye: No discharge  Left eye: No discharge  Conjunctiva/sclera: Conjunctivae normal    Cardiovascular:      Rate and Rhythm: Normal rate and regular rhythm  Heart sounds: Normal heart sounds  No murmur heard  Pulmonary:      Effort: Pulmonary effort is normal  No respiratory distress  Breath sounds: Normal breath sounds  No stridor  No wheezing or rales     Abdominal:      General: Bowel sounds are normal  There is no distension  Palpations: Abdomen is soft  Tenderness: There is no abdominal tenderness  There is no guarding  Musculoskeletal:         General: No deformity  Normal range of motion  Cervical back: Normal range of motion and neck supple  Lymphadenopathy:      Cervical: No cervical adenopathy  Skin:     General: Skin is warm and dry  Neurological:      Mental Status: She is alert  She is not disoriented  GCS: GCS eye subscore is 4  GCS verbal subscore is 5  GCS motor subscore is 6     Psychiatric:         Behavior: Behavior normal          Vital Signs  ED Triage Vitals   Temperature Pulse Respirations Blood Pressure SpO2   09/16/22 1515 09/16/22 1515 09/16/22 1515 09/16/22 1515 09/16/22 1515   97 9 °F (36 6 °C) 80 18 (!) 150/103 99 %      Temp Source Heart Rate Source Patient Position - Orthostatic VS BP Location FiO2 (%)   09/16/22 1515 09/16/22 1515 09/16/22 1515 09/16/22 1515 --   Temporal Monitor Sitting Left arm       Pain Score       09/16/22 1714       10 - Worst Possible Pain           Vitals:    09/16/22 1515 09/16/22 1815   BP: (!) 150/103 141/89   Pulse: 80 78   Patient Position - Orthostatic VS: Sitting          Visual Acuity      ED Medications  Medications   sodium chloride 0 9 % bolus 1,000 mL (0 mL Intravenous Stopped 9/16/22 1811)   metoclopramide (REGLAN) injection 10 mg (10 mg Intravenous Given 9/16/22 1705)   acetaminophen (TYLENOL) tablet 975 mg (975 mg Oral Given 9/16/22 1714)   predniSONE tablet 40 mg (40 mg Oral Given 9/16/22 1811)       Diagnostic Studies  Results Reviewed     Procedure Component Value Units Date/Time    Basic metabolic panel [[id number]]  (Abnormal) Collected: 09/16/22 1703    Lab Status: Final result Specimen: Blood from Arm, Right Updated: 09/16/22 1726     Sodium 135 mmol/L      Potassium 4 6 mmol/L      Chloride 99 mmol/L      CO2 28 mmol/L      ANION GAP 8 mmol/L      BUN 11 mg/dL      Creatinine 0 50 mg/dL      Glucose 80 mg/dL Calcium 9 2 mg/dL      eGFR 125 ml/min/1 73sq m     Narrative:      National Kidney Disease Foundation guidelines for Chronic Kidney Disease (CKD):     Stage 1 with normal or high GFR (GFR > 90 mL/min/1 73 square meters)    Stage 2 Mild CKD (GFR = 60-89 mL/min/1 73 square meters)    Stage 3A Moderate CKD (GFR = 45-59 mL/min/1 73 square meters)    Stage 3B Moderate CKD (GFR = 30-44 mL/min/1 73 square meters)    Stage 4 Severe CKD (GFR = 15-29 mL/min/1 73 square meters)    Stage 5 End Stage CKD (GFR <15 mL/min/1 73 square meters)  Note: GFR calculation is accurate only with a steady state creatinine    CBC and differential [[id number]]  (Abnormal) Collected: 09/16/22 1703    Lab Status: Final result Specimen: Blood from Arm, Right Updated: 09/16/22 1712     WBC 4 89 Thousand/uL      RBC 4 58 Million/uL      Hemoglobin 15 5 g/dL      Hematocrit 44 9 %      MCV 98 fL      MCH 33 8 pg      MCHC 34 5 g/dL      RDW 11 7 %      MPV 9 2 fL      Platelets 786 Thousands/uL      nRBC 0 /100 WBCs      Neutrophils Relative 47 %      Immat GRANS % 0 %      Lymphocytes Relative 32 %      Monocytes Relative 16 %      Eosinophils Relative 3 %      Basophils Relative 2 %      Neutrophils Absolute 2 32 Thousands/µL      Immature Grans Absolute 0 02 Thousand/uL      Lymphocytes Absolute 1 54 Thousands/µL      Monocytes Absolute 0 77 Thousand/µL      Eosinophils Absolute 0 16 Thousand/µL      Basophils Absolute 0 08 Thousands/µL                  No orders to display              Procedures  Procedures         ED Course                     MDM  Number of Diagnoses or Management Options  COVID-19: new and requires workup  Diagnosis management comments: 31yo female presenting for evaluation of flu-like symptoms x several days  C/o cough, fatigue, n/v/d  Home COVID test was positive  She feels dehydrated  She is afebrile and hemodynamically stable  Patient is well appearing in no acute distress  Initial ED plan: Check CBC and BMP   IV Reglan and fluid bolus for symptoms  Final assessment: Labs unremarkable including normal white count, renal function, and electrolytes  She is feeling improved on re-evaluation  No episodes of hypoxia in the ED  No indication for admission  Will start on a course of prednisone given history of increasing inhaler requirements  Advised close PCP follow-up  ED return precautions discussed  Patient expressed understanding and is agreeable to plan  Patient discharged in stable condition  Amount and/or Complexity of Data Reviewed  Clinical lab tests: ordered and reviewed  Review and summarize past medical records: yes    Risk of Complications, Morbidity, and/or Mortality  Presenting problems: moderate  Diagnostic procedures: moderate  Management options: moderate    Patient Progress  Patient progress: improved      Disposition  Final diagnoses:   COVID-19     Time reflects when diagnosis was documented in both MDM as applicable and the Disposition within this note     Time User Action Codes Description Comment    9/16/2022  6:02 PM [address] [place] [address] [place], [place] [U07 1] COVID-19       ED Disposition     ED Disposition   Discharge    Condition   Stable    Date/Time   Fri Sep 16, 2022  6:02 PM    Comment   [facility] [employer] discharge to home/self care                 Follow-up Information     Follow up With Specialties Details Why Contact Info Additional [address], DO Family Medicine Schedule an appointment as soon as possible for a visit   [address] [facility] [address]  [phone]       [facility] Emergency Medicine  If symptoms worsen [address] Emergency Department, [address] [place] [address], [id number]          Discharge Medication List as of 9/16/2022  6:03 PM      START taking these medications    Details   predniSONE 20 mg tablet Take 2 tablets (40 mg total) by mouth daily for 4 days, Starting Sat 9/17/2022, Until Wed 9/21/2022, Normal         CONTINUE these medications which have NOT CHANGED    Details   cyclobenzaprine (FLEXERIL) 10 mg tablet Take 1 tablet (10 mg total) by mouth 3 (three) times a day as needed for muscle spasms for up to 7 days, Starting Mon 8/26/2019, Until Mon 9/2/2019, Print      levocetirizine (XYZAL) 5 MG tablet Take 5 mg by mouth, Starting Tue 10/6/2020, Until Wed 10/6/2021, Historical Med             No discharge procedures on file      PDMP Review     None          ED Provider  Electronically Signed by           Jazmin Silva PA-C  09/16/22 8112

## 2022-11-26 ENCOUNTER — HOSPITAL ENCOUNTER (EMERGENCY)
Facility: HOSPITAL | Age: 37
Discharge: HOME | End: 2022-11-26
Attending: EMERGENCY MEDICINE

## 2022-11-26 ENCOUNTER — APPOINTMENT (EMERGENCY)
Dept: RADIOLOGY | Facility: HOSPITAL | Age: 37
End: 2022-11-26

## 2022-11-26 VITALS
OXYGEN SATURATION: 98 % | SYSTOLIC BLOOD PRESSURE: 163 MMHG | TEMPERATURE: 98 F | DIASTOLIC BLOOD PRESSURE: 100 MMHG | HEART RATE: 76 BPM | RESPIRATION RATE: 18 BRPM

## 2022-11-26 DIAGNOSIS — B34.9 VIRAL SYNDROME: Primary | ICD-10-CM

## 2022-11-26 LAB
EXT PREGNANCY TEST URINE: NEGATIVE
EXT. CONTROL: NORMAL
FLUAV RNA RESP QL NAA+PROBE: NEGATIVE
FLUBV RNA RESP QL NAA+PROBE: NEGATIVE
RSV RNA RESP QL NAA+PROBE: NEGATIVE
SARS-COV-2 RNA RESP QL NAA+PROBE: NEGATIVE

## 2022-11-26 RX ORDER — ONDANSETRON 4 MG/1
4 TABLET, ORALLY DISINTEGRATING ORAL ONCE
Status: COMPLETED | OUTPATIENT
Start: 2022-11-26 | End: 2022-11-26

## 2022-11-26 RX ORDER — ONDANSETRON 4 MG/1
4 TABLET, ORALLY DISINTEGRATING ORAL EVERY 6 HOURS PRN
Qty: 20 TABLET | Refills: 0 | Status: SHIPPED | OUTPATIENT
Start: 2022-11-26

## 2022-11-26 RX ORDER — KETOROLAC TROMETHAMINE 30 MG/ML
15 INJECTION, SOLUTION INTRAMUSCULAR; INTRAVENOUS ONCE
Status: COMPLETED | OUTPATIENT
Start: 2022-11-26 | End: 2022-11-26

## 2022-11-26 RX ADMIN — KETOROLAC TROMETHAMINE 15 MG: 30 INJECTION, SOLUTION INTRAMUSCULAR at 12:10

## 2022-11-26 RX ADMIN — ONDANSETRON 4 MG: 4 TABLET, ORALLY DISINTEGRATING ORAL at 12:09

## 2022-11-26 NOTE — DISCHARGE INSTRUCTIONS
Drink plenty of fluids stay hydrated  Take Tylenol and ibuprofen as needed  Follow-up with your family doctor  Return to the emergency department for any new or worsening symptoms

## 2022-11-26 NOTE — Clinical Note
Monik Collet was seen and treated in our emergency department on 11/26/2022  Diagnosis:     Rosita Aceves  may return to work on return date  She may return on this date: 11/30/2022         If you have any questions or concerns, please don't hesitate to call        Ishaan Field DO    ______________________________           _______________          _______________  Hospital Representative                              Date                                Time

## 2022-11-26 NOTE — ED PROVIDER NOTES
Pt Name: Rafael Vaughan  MRN: [id number]  [place] 1985  Age/Sex: 30 y o  female  Date of evaluation: 11/26/2022  PCP: Sofi Cantu DO    CHIEF COMPLAINT    Chief Complaint   Patient presents with   • Flu Symptoms     Pt reports that she has been achy and has a fever and chills and headache          HPI and MDM    30 y o  female presenting with cold symptoms since Wednesday  Patient states she has had fevers, chills, productive cough, nausea and vomiting and diarrhea  Has had a mild runny nose  No sore throat  No known sick contacts  However, does work in [employer]  Has had body aches and headaches as well  No chest pain or shortness of breath  Workup in the emergency department overall reassuring  Chest x-ray is also spoke  Patient updated with all results  Concern for viral illness  Prescribed Zofran  Advised supportive care, PCP follow-up, return precautions cussed, patient verbalized understanding and is in agreement with plan  Medications   ketorolac (TORADOL) injection 15 mg (15 mg Intramuscular Given 11/26/22 1210)   ondansetron (ZOFRAN-ODT) dispersible tablet 4 mg (4 mg Oral Given 11/26/22 1209)         Past Medical and Surgical History    Past Medical History:   Diagnosis Date   • Asthma    • Sleep apnea        Past Surgical History:   Procedure Laterality Date   • CHOLECYSTECTOMY     • GASTRECTOMY SLEEVE LAPAROSCOPIC         Family History   Problem Relation Age of Onset   • Heart disease Mother        Social History     Tobacco Use   • Smoking status: Never   • Smokeless tobacco: Never   Vaping Use   • Vaping Use: Never used   Substance Use Topics   • Alcohol use: Yes     Comment: socially   • Drug use: Never           Allergies    Allergies   Allergen Reactions   • Asa [Aspirin] Other (See Comments)     asthma       Home Medications    Prior to Admission medications    Medication Sig Start Date End Date Taking?  Authorizing Provider   cyclobenzaprine (FLEXERIL) 10 mg tablet Take 1 tablet (10 mg total) by mouth 3 (three) times a day as needed for muscle spasms for up to 7 days 8/26/19 9/2/19  SON Casey   levocetirizine (XYZAL) 5 MG tablet Take 5 mg by mouth 10/6/20 10/6/21  Historical Provider, MD           Review of Systems    Review of Systems   Constitutional: Positive for chills, fatigue and fever  HENT: Positive for rhinorrhea  Negative for sore throat  Eyes: Negative for pain and visual disturbance  Respiratory: Positive for cough  Negative for shortness of breath  Cardiovascular: Negative for chest pain and leg swelling  Gastrointestinal: Negative for abdominal pain, nausea and vomiting  Genitourinary: Negative for dysuria and hematuria  Musculoskeletal: Positive for myalgias  Negative for joint swelling  Skin: Negative for rash and wound  Neurological: Positive for headaches  Negative for syncope  Physical Exam      ED Triage Vitals   Temperature Pulse Respirations Blood Pressure SpO2   11/26/22 1016 11/26/22 1016 11/26/22 1016 11/26/22 1019 11/26/22 1016   98 °F (36 7 °C) 94 16 (!) 173/96 99 %      Temp src Heart Rate Source Patient Position - Orthostatic VS BP Location FiO2 (%)   -- 11/26/22 1016 11/26/22 1122 11/26/22 1122 --    Monitor Lying Right arm       Pain Score       --                      Physical Exam  Constitutional:       General: She is not in acute distress  Appearance: She is not ill-appearing  HENT:      Head: Normocephalic and atraumatic  Right Ear: External ear normal       Left Ear: External ear normal       Nose: Nose normal       Mouth/Throat:      Mouth: Mucous membranes are moist    Eyes:      Extraocular Movements: Extraocular movements intact  Pupils: Pupils are equal, round, and reactive to light  Cardiovascular:      Rate and Rhythm: Normal rate and regular rhythm  Pulmonary:      Effort: No respiratory distress  Breath sounds: Normal breath sounds  No wheezing  Abdominal:      General: There is no distension  Palpations: Abdomen is soft  Musculoskeletal:         General: No swelling or deformity  Normal range of motion  Cervical back: Normal range of motion and neck supple  Skin:     General: Skin is warm  Findings: No erythema  Neurological:      Mental Status: She is alert and oriented to person, place, and time  Mental status is at baseline  Diagnostic Results      Labs:    Results Reviewed     Procedure Component Value Units Date/Time    POCT pregnancy, urine [[id number]]  (Normal) Resulted: 11/26/22 1209    Lab Status: Final result Updated: 11/26/22 1210     EXT Preg Test, Ur Negative     Control Valid    FLU/RSV/COVID - if FLU/RSV clinically relevant [[id number]]  (Normal) Collected: 11/26/22 1026    Lab Status: Final result Specimen: Nares from Nasopharyngeal Swab Updated: 11/26/22 1130     SARS-CoV-2 Negative     INFLUENZA A PCR Negative     INFLUENZA B PCR Negative     RSV PCR Negative    Narrative:      FOR PEDIATRIC PATIENTS - copy/paste COVID Guidelines URL to browser: [email or web address]    SARS-CoV-2 assay is a Nucleic Acid Amplification assay intended for the  qualitative detection of nucleic acid from SARS-CoV-2 in nasopharyngeal  swabs  Results are for the presumptive identification of SARS-CoV-2 RNA  Positive results are indicative of infection with SARS-CoV-2, the virus  causing COVID-19, but do not rule out bacterial infection or co-infection  with other viruses  Laboratories within the [place] and its  territories are required to report all positive results to the appropriate  public health authorities  Negative results do not preclude SARS-CoV-2  infection and should not be used as the sole basis for treatment or other  patient management decisions   Negative results must be combined with  clinical observations, patient history, and epidemiological information  This test has not been FDA cleared or approved  This test has been authorized by FDA under an Emergency Use Authorization  (EUA)  This test is only authorized for the duration of time the  declaration that circumstances exist justifying the authorization of the  emergency use of an in vitro diagnostic tests for detection of SARS-CoV-2  virus and/or diagnosis of COVID-19 infection under section 564(b)(1) of  the Act, 21 U  S C  141IJM-4(D)(9), unless the authorization is terminated  or revoked sooner  The test has been validated but independent review by FDA  and CLIA is pending  Test performed using [employer] GeneXpert: This RT-PCR assay targets N2,  a region unique to SARS-CoV-2  A conserved region in the E-gene was chosen  for pan-Sarbecovirus detection which includes SARS-CoV-2  According to CMS-2020-01-R, this platform meets the definition of high-throughput technology  All labs reviewed and utilized in the medical decision making process    Radiology:    XR chest 2 views   Final Result      No acute cardiopulmonary disease  Workstation performed: [id number]             All radiology studies independently viewed by me and interpreted by the radiologist     Procedure    Procedures        FINAL IMPRESSION    Final diagnoses:   Viral syndrome         DISPOSITION    Time reflects when diagnosis was documented in both MDM as applicable and the Disposition within this note     Time User Action Codes Description Comment    11/26/2022  1:20 PM Seth Armas Add [B34 9] Viral syndrome       ED Disposition     ED Disposition   Discharge    Condition   Stable    Date/Time   Sat Nov 26, 2022  1:20 PM    Comment   [address] [facility] discharge to home/self care                 Follow-up Information     Follow up With Specialties Details Why [facility], DO Family Medicine Call in 2 days  [facility] [address] [facility] [address]  [address]  [phone]              PATIENT REFERRED TO:    Ez Milner DO  [address] [facility] [address] [place] [address]  N  [phone]    Call in 2 days        DISCHARGE MEDICATIONS:    Discharge Medication List as of 11/26/2022  1:21 PM      START taking these medications    Details   ondansetron (Zofran ODT) 4 mg disintegrating tablet Take 1 tablet (4 mg total) by mouth every 6 (six) hours as needed for nausea or vomiting, Starting Sat 11/26/2022, Normal         CONTINUE these medications which have NOT CHANGED    Details   cyclobenzaprine (FLEXERIL) 10 mg tablet Take 1 tablet (10 mg total) by mouth 3 (three) times a day as needed for muscle spasms for up to 7 days, Starting Mon 8/26/2019, Until Mon 9/2/2019, Print      levocetirizine (XYZAL) 5 MG tablet Take 5 mg by mouth, Starting Tue 10/6/2020, Until Wed 10/6/2021, Historical Med             No discharge procedures on file  Candis Ward DO        This note was partially completed using voice recognition technology, and was scanned for gross errors; however some errors may still exist  Please contact the author with any questions or requests for clarification        Candis Ward DO  11/26/22 6425

## 2023-01-28 ENCOUNTER — APPOINTMENT (EMERGENCY)
Dept: RADIOLOGY | Facility: HOSPITAL | Age: 38
End: 2023-01-28

## 2023-01-28 ENCOUNTER — HOSPITAL ENCOUNTER (EMERGENCY)
Facility: HOSPITAL | Age: 38
Discharge: HOME | End: 2023-01-28
Attending: EMERGENCY MEDICINE

## 2023-01-28 VITALS
WEIGHT: 140 LBS | OXYGEN SATURATION: 97 % | HEART RATE: 76 BPM | DIASTOLIC BLOOD PRESSURE: 108 MMHG | RESPIRATION RATE: 18 BRPM | SYSTOLIC BLOOD PRESSURE: 165 MMHG | BODY MASS INDEX: 25.61 KG/M2 | TEMPERATURE: 98.6 F

## 2023-01-28 DIAGNOSIS — J45.909 ASTHMA: ICD-10-CM

## 2023-01-28 DIAGNOSIS — B34.9 ACUTE VIRAL SYNDROME: Primary | ICD-10-CM

## 2023-01-28 RX ORDER — PREDNISONE 20 MG/1
40 TABLET ORAL ONCE
Status: COMPLETED | OUTPATIENT
Start: 2023-01-28 | End: 2023-01-28

## 2023-01-28 RX ORDER — PREDNISONE 20 MG/1
40 TABLET ORAL DAILY
Qty: 8 TABLET | Refills: 0 | Status: SHIPPED | OUTPATIENT
Start: 2023-01-28 | End: 2023-02-01

## 2023-01-28 RX ADMIN — PREDNISONE 40 MG: 20 TABLET ORAL at 11:22

## 2023-01-28 NOTE — DISCHARGE INSTRUCTIONS
Follow up with PCP  Prednisone x 4 days  Nebulizer as needed  Return to the ED with new or worsening symptoms including but not limited to shortness of breath, difficulty breathing, chest pain, fevers uncontrolled by tylenol/motrin

## 2023-01-28 NOTE — ED PROVIDER NOTES
History  Chief Complaint   Patient presents with   • Flu Symptoms     Pt reports since Tuesday shes had cough, chills, fever, and bodyaches  Patient is a 51-year-old female with a past medical history of asthma and sleep apnea presenting to the emergency department for evaluation of flulike symptoms  Reports on Tuesday she began having cough, chills, fever and body aches  Reports she has been doing nebulizer treatments at home which has helped with the cough and intermittent wheezing  Reports she does not think this is an asthma exacerbation  Denies rash, headache, weakness, dizziness, visual changes, abdominal pain, nausea, vomiting, diarrhea, constipation, chest pain, shortness of breath or difficulty breathing  Does not offer any other concerns or complaints  History provided by:  Patient   used: No    Flu Symptoms  Presenting symptoms: cough, fever and myalgias    Presenting symptoms: no diarrhea, no fatigue, no headaches, no nausea, no rhinorrhea, no shortness of breath, no sore throat and no vomiting    Associated symptoms: chills    Associated symptoms: no ear pain        Prior to Admission Medications   Prescriptions Last Dose Informant Patient Reported? Taking?    cyclobenzaprine (FLEXERIL) 10 mg tablet   No No   Sig: Take 1 tablet (10 mg total) by mouth 3 (three) times a day as needed for muscle spasms for up to 7 days   levocetirizine (XYZAL) 5 MG tablet   Yes No   Sig: Take 5 mg by mouth   ondansetron (Zofran ODT) 4 mg disintegrating tablet   No No   Sig: Take 1 tablet (4 mg total) by mouth every 6 (six) hours as needed for nausea or vomiting      Facility-Administered Medications: None       Past Medical History:   Diagnosis Date   • Asthma    • Sleep apnea        Past Surgical History:   Procedure Laterality Date   • CHOLECYSTECTOMY     • GASTRECTOMY SLEEVE LAPAROSCOPIC         Family History   Problem Relation Age of Onset   • Heart disease Mother      I have reviewed and agree with the history as documented  E-Cigarette/Vaping   • E-Cigarette Use Never User      E-Cigarette/Vaping Substances   • Nicotine No    • THC No    • CBD No    • Flavoring No    • Other No    • Unknown No      Social History     Tobacco Use   • Smoking status: Never   • Smokeless tobacco: Never   Vaping Use   • Vaping Use: Never used   Substance Use Topics   • Alcohol use: Yes     Comment: socially   • Drug use: Never       Review of Systems   Constitutional: Positive for chills and fever  Negative for fatigue  HENT: Negative for ear pain, rhinorrhea and sore throat  Eyes: Negative for pain and visual disturbance  Respiratory: Positive for cough  Negative for shortness of breath  Cardiovascular: Negative for chest pain and palpitations  Gastrointestinal: Negative for abdominal pain, diarrhea, nausea and vomiting  Genitourinary: Negative for dysuria and hematuria  Musculoskeletal: Positive for myalgias  Negative for arthralgias and back pain  Skin: Negative for color change and rash  Neurological: Negative for dizziness, seizures, syncope, weakness and headaches  All other systems reviewed and are negative  Physical Exam  Physical Exam  Vitals and nursing note reviewed  Constitutional:       General: She is awake  She is not in acute distress  Appearance: Normal appearance  She is well-developed  She is not ill-appearing, toxic-appearing or diaphoretic  HENT:      Head: Normocephalic and atraumatic  Right Ear: External ear normal       Left Ear: External ear normal       Nose: Nose normal       Mouth/Throat:      Mouth: Mucous membranes are moist    Eyes:      General: No scleral icterus  Right eye: No discharge  Left eye: No discharge  Conjunctiva/sclera: Conjunctivae normal    Cardiovascular:      Rate and Rhythm: Normal rate and regular rhythm  Heart sounds: No murmur heard    Pulmonary:      Effort: Pulmonary effort is normal  No respiratory distress  Breath sounds: Normal breath sounds  No stridor  No wheezing or rales  Abdominal:      Palpations: Abdomen is soft  Tenderness: There is no abdominal tenderness  Musculoskeletal:         General: No swelling, deformity or signs of injury  Normal range of motion  Cervical back: Normal range of motion and neck supple  No rigidity  Skin:     General: Skin is warm and dry  Capillary Refill: Capillary refill takes less than 2 seconds  Coloration: Skin is not jaundiced  Findings: No erythema or rash  Neurological:      General: No focal deficit present  Mental Status: She is alert and oriented to person, place, and time  Mental status is at baseline  Cranial Nerves: No cranial nerve deficit  Gait: Gait normal    Psychiatric:         Mood and Affect: Mood normal          Behavior: Behavior normal  Behavior is cooperative  Thought Content:  Thought content normal          Judgment: Judgment normal          Vital Signs  ED Triage Vitals   Temperature Pulse Respirations Blood Pressure SpO2   01/28/23 0928 01/28/23 0928 01/28/23 0928 01/28/23 0929 01/28/23 0928   98 6 °F (37 °C) 76 18 (!) 165/108 97 %      Temp src Heart Rate Source Patient Position - Orthostatic VS BP Location FiO2 (%)   -- -- -- -- --             Pain Score       --                  Vitals:    01/28/23 0928 01/28/23 0929   BP:  (!) 165/108   Pulse: 76          Visual Acuity      ED Medications  Medications   predniSONE tablet 40 mg (40 mg Oral Given 1/28/23 1122)       Diagnostic Studies  Results Reviewed     Procedure Component Value Units Date/Time    FLU/RSV/COVID - if FLU/RSV clinically relevant [[id number]]  (Normal) Collected: 01/28/23 0931    Lab Status: Final result Specimen: Nares from Nose Updated: 01/28/23 1018     SARS-CoV-2 Negative     INFLUENZA A PCR Negative     INFLUENZA B PCR Negative     RSV PCR Negative    Narrative:      FOR PEDIATRIC PATIENTS - copy/paste COVID Guidelines URL to browser: [email or web address]  [email or web address]    SARS-CoV-2 assay is a Nucleic Acid Amplification assay intended for the  qualitative detection of nucleic acid from SARS-CoV-2 in nasopharyngeal  swabs  Results are for the presumptive identification of SARS-CoV-2 RNA  Positive results are indicative of infection with SARS-CoV-2, the virus  causing COVID-19, but do not rule out bacterial infection or co-infection  with other viruses  Laboratories within the [place] and its  territories are required to report all positive results to the appropriate  public health authorities  Negative results do not preclude SARS-CoV-2  infection and should not be used as the sole basis for treatment or other  patient management decisions  Negative results must be combined with  clinical observations, patient history, and epidemiological information  This test has not been FDA cleared or approved  This test has been authorized by FDA under an Emergency Use Authorization  (EUA)  This test is only authorized for the duration of time the  declaration that circumstances exist justifying the authorization of the  emergency use of an in vitro diagnostic tests for detection of SARS-CoV-2  virus and/or diagnosis of COVID-19 infection under section 564(b)(1) of  the Act, 21 U  S C  958LPF-2(Z)(9), unless the authorization is terminated  or revoked sooner  The test has been validated but independent review by FDA  and CLIA is pending  Test performed using [employer] GeneXpert: This RT-PCR assay targets N2,  a region unique to SARS-CoV-2  A conserved region in the E-gene was chosen  for pan-Sarbecovirus detection which includes SARS-CoV-2  According to CMS-2020-01-R, this platform meets the definition of high-throughput technology  XR chest 2 views   Final Result by Samira Ho MD (01/28 1213)      No acute cardiopulmonary disease  Workstation performed: [id number]                    Procedures  Procedures         ED Course                 Medical Decision Making    This is a 82-year-old female presenting to the emergency department for evaluation of flulike symptoms  Reports Tuesday she began having cough, chills, fevers and body aches  Reports a history of asthma, has been using her nebulizer at home which has helped with the symptoms  Patient is well-appearing with stable vital signs on initial examination  Patient reports she does not need any refills of albuterol for her nebulizer at home  Differential diagnosis to include but is not limited to: COVID/flu/RSV, acute viral syndrome, asthma exacerbation, pneumonia    Initial ED Plan: COVID/flu/RSV swab, chest x-ray    ED results: Xray images chest independently visualized and interpreted by me - no acute cardiopulmonary disease    Final ED assessment: Patient is stable and well appearing  Discussed radiologic studies and laboratory results  Discussed follow-up with PCP  Discussed supportive care, Tylenol/Motrin as needed for fevers  Strict return precautions were discussed including but not limited to shortness of breath, difficulty breathing, cough, fevers uncontrolled with Tylenol/Motrin  Patient verbalized understanding and is agreeable with the plan for discharge  Acute viral syndrome: complicated acute illness or injury  Asthma: complicated acute illness or injury  Amount and/or Complexity of Data Reviewed  Radiology: ordered  Risk  Prescription drug management            Disposition  Final diagnoses:   Acute viral syndrome   Asthma     Time reflects when diagnosis was documented in both MDM as applicable and the Disposition within this note     Time User Action Codes Description Comment    1/28/2023 11:03 AM Luke Thomas [B34 9] Acute viral syndrome     1/28/2023 11:04 AM Luke Thomas [B94 290] Asthma       ED Disposition     ED Disposition Discharge    Condition   Stable    Date/Time   Sat Jan 28, 2023 11:03 AM    Comment   [address] discharge to home/self care  Follow-up Information     Follow up With Specialties Details Why Contact Info Additional [address], DO Family Medicine Call in 3 days For follow up [address] [facility]       [address] [facility] Emergency Department Emergency Medicine Go to  If symptoms worsen [address] Emergency Department, [address] [place] [address], [facility]          Discharge Medication List as of 1/28/2023 11:05 AM      START taking these medications    Details   predniSONE 20 mg tablet Take 2 tablets (40 mg total) by mouth daily for 4 days, Starting Sat 1/28/2023, Until Wed 2/1/2023, Normal         CONTINUE these medications which have NOT CHANGED    Details   cyclobenzaprine (FLEXERIL) 10 mg tablet Take 1 tablet (10 mg total) by mouth 3 (three) times a day as needed for muscle spasms for up to 7 days, Starting Mon 8/26/2019, Until Mon 9/2/2019, Print      levocetirizine (XYZAL) 5 MG tablet Take 5 mg by mouth, Starting Tue 10/6/2020, Until Wed 10/6/2021, Historical Med      ondansetron (Zofran ODT) 4 mg disintegrating tablet Take 1 tablet (4 mg total) by mouth every 6 (six) hours as needed for nausea or vomiting, Starting Sat 11/26/2022, Normal             No discharge procedures on file      PDMP Review     None          ED Provider  Electronically Signed by           Oliverio Villafuerte PA-C  01/28/23 8630

## 2023-09-17 ENCOUNTER — HOSPITAL ENCOUNTER (EMERGENCY)
Facility: HOSPITAL | Age: 38
Discharge: HOME | End: 2023-09-17
Attending: EMERGENCY MEDICINE | Admitting: EMERGENCY MEDICINE
Payer: COMMERCIAL

## 2023-09-17 ENCOUNTER — APPOINTMENT (EMERGENCY)
Dept: RADIOLOGY | Facility: HOSPITAL | Age: 38
End: 2023-09-17
Payer: COMMERCIAL

## 2023-09-17 VITALS
TEMPERATURE: 98.7 F | SYSTOLIC BLOOD PRESSURE: 133 MMHG | RESPIRATION RATE: 17 BRPM | DIASTOLIC BLOOD PRESSURE: 81 MMHG | HEART RATE: 93 BPM | OXYGEN SATURATION: 98 %

## 2023-09-17 DIAGNOSIS — J45.901 ACUTE ASTHMA EXACERBATION: Primary | ICD-10-CM

## 2023-09-17 DIAGNOSIS — J06.9 VIRAL URI: ICD-10-CM

## 2023-09-17 PROCEDURE — 94640 AIRWAY INHALATION TREATMENT: CPT

## 2023-09-17 PROCEDURE — 71046 X-RAY EXAM CHEST 2 VIEWS: CPT

## 2023-09-17 PROCEDURE — 0241U HB NFCT DS VIR RESP RNA 4 TRGT: CPT | Performed by: PHYSICIAN ASSISTANT

## 2023-09-17 PROCEDURE — 99285 EMERGENCY DEPT VISIT HI MDM: CPT | Performed by: PHYSICIAN ASSISTANT

## 2023-09-17 PROCEDURE — 99283 EMERGENCY DEPT VISIT LOW MDM: CPT

## 2023-09-17 RX ORDER — ALBUTEROL SULFATE 2.5 MG/3ML
5 SOLUTION RESPIRATORY (INHALATION) ONCE
Status: COMPLETED | OUTPATIENT
Start: 2023-09-17 | End: 2023-09-17

## 2023-09-17 RX ORDER — PREDNISONE 50 MG/1
50 TABLET ORAL DAILY
Qty: 4 TABLET | Refills: 0 | Status: SHIPPED | OUTPATIENT
Start: 2023-09-18 | End: 2023-09-22

## 2023-09-17 RX ADMIN — ALBUTEROL SULFATE 5 MG: 2.5 SOLUTION RESPIRATORY (INHALATION) at 09:01

## 2023-09-17 RX ADMIN — IPRATROPIUM BROMIDE 0.5 MG: 0.5 SOLUTION RESPIRATORY (INHALATION) at 09:01

## 2023-09-17 RX ADMIN — PREDNISONE 50 MG: 20 TABLET ORAL at 10:22

## 2023-09-17 NOTE — DISCHARGE INSTRUCTIONS
Take prednisone as prescribed. Use inhaler and nebulizer as needed for wheezing/chest tightness. Please follow-up with your family doctor. Return to the ER with any worsening symptoms.

## 2023-09-17 NOTE — ED PROVIDER NOTES
History  Chief Complaint   Patient presents with   • Asthma     C/o "my asthma is acting up and I have a sore throat and runny nose"     46yo female with a history of asthma presenting for evaluation of URI symptoms. She has been sick for the past 3-4 days with cough, congestion, and sore throat. No fevers. She reports increasing inhaler use and her asthma started worsening while at work today so she was sent to the ED. She is a non-smoker. History provided by:  Patient   used: No        Prior to Admission Medications   Prescriptions Last Dose Informant Patient Reported? Taking? cyclobenzaprine (FLEXERIL) 10 mg tablet   No No   Sig: Take 1 tablet (10 mg total) by mouth 3 (three) times a day as needed for muscle spasms for up to 7 days   levocetirizine (XYZAL) 5 MG tablet  Self Yes No   Sig: Take 5 mg by mouth   ondansetron (Zofran ODT) 4 mg disintegrating tablet   No No   Sig: Take 1 tablet (4 mg total) by mouth every 6 (six) hours as needed for nausea or vomiting      Facility-Administered Medications: None       Past Medical History:   Diagnosis Date   • Asthma    • Sleep apnea        Past Surgical History:   Procedure Laterality Date   • CHOLECYSTECTOMY     • GASTRECTOMY SLEEVE LAPAROSCOPIC         Family History   Problem Relation Age of Onset   • Heart disease Mother      I have reviewed and agree with the history as documented. E-Cigarette/Vaping   • E-Cigarette Use Never User      E-Cigarette/Vaping Substances   • Nicotine No    • THC No    • CBD No    • Flavoring No    • Other No    • Unknown No      Social History     Tobacco Use   • Smoking status: Never   • Smokeless tobacco: Never   Vaping Use   • Vaping Use: Never used   Substance Use Topics   • Alcohol use: Yes     Comment: socially   • Drug use: Never       Review of Systems   Constitutional: Negative for chills and fever. HENT: Positive for congestion and sore throat. Negative for drooling and voice change.     Eyes: Negative for discharge and redness. Respiratory: Positive for cough and wheezing. Negative for shortness of breath and stridor. Cardiovascular: Negative for chest pain and leg swelling. Gastrointestinal: Negative for nausea and vomiting. Musculoskeletal: Negative for neck pain and neck stiffness. Skin: Negative for color change and rash. Neurological: Negative for seizures and syncope. Psychiatric/Behavioral: Negative for confusion. The patient is not nervous/anxious. All other systems reviewed and are negative. Physical Exam  Physical Exam  Vitals and nursing note reviewed. Constitutional:       General: She is not in acute distress. Appearance: She is well-developed. She is not diaphoretic. HENT:      Head: Normocephalic and atraumatic. Right Ear: Tympanic membrane, ear canal and external ear normal.      Left Ear: Tympanic membrane, ear canal and external ear normal.      Nose: Congestion present. Mouth/Throat:      Mouth: Mucous membranes are moist.      Pharynx: Oropharynx is clear. No oropharyngeal exudate. Eyes:      General: No scleral icterus. Right eye: No discharge. Left eye: No discharge. Conjunctiva/sclera: Conjunctivae normal.   Cardiovascular:      Rate and Rhythm: Normal rate and regular rhythm. Heart sounds: Normal heart sounds. No murmur heard. Pulmonary:      Effort: Pulmonary effort is normal. No respiratory distress. Breath sounds: Normal breath sounds. No stridor. No wheezing or rales. Musculoskeletal:         General: No deformity. Normal range of motion. Cervical back: Normal range of motion and neck supple. Lymphadenopathy:      Cervical: No cervical adenopathy. Skin:     General: Skin is warm and dry. Neurological:      Mental Status: She is alert. She is not disoriented. GCS: GCS eye subscore is 4. GCS verbal subscore is 5. GCS motor subscore is 6.    Psychiatric:         Behavior: Behavior normal. Vital Signs  ED Triage Vitals [09/17/23 0846]   Temperature Pulse Respirations Blood Pressure SpO2   98.7 °F (37.1 °C) 93 17 133/81 98 %      Temp Source Heart Rate Source Patient Position - Orthostatic VS BP Location FiO2 (%)   Tympanic Monitor -- -- --      Pain Score       --           Vitals:    09/17/23 0846   BP: 133/81   Pulse: 93         Visual Acuity      ED Medications  Medications   albuterol inhalation solution 5 mg (5 mg Nebulization Given 9/17/23 0901)   ipratropium (ATROVENT) 0.02 % inhalation solution 0.5 mg (0.5 mg Nebulization Given 9/17/23 0901)   predniSONE tablet 50 mg (50 mg Oral Given 9/17/23 1022)       Diagnostic Studies  Results Reviewed     Procedure Component Value Units Date/Time    FLU/RSV/COVID - if FLU/RSV clinically relevant [[id number]]  (Normal) Collected: 09/17/23 0904    Lab Status: Final result Specimen: Nares from Nose Updated: 09/17/23 0950     SARS-CoV-2 Negative     INFLUENZA A PCR Negative     INFLUENZA B PCR Negative     RSV PCR Negative    Narrative:      FOR PEDIATRIC PATIENTS - copy/paste COVID Guidelines URL to browser: [email or web address]    SARS-CoV-2 assay is a Nucleic Acid Amplification assay intended for the  qualitative detection of nucleic acid from SARS-CoV-2 in nasopharyngeal  swabs. Results are for the presumptive identification of SARS-CoV-2 RNA. Positive results are indicative of infection with SARS-CoV-2, the virus  causing COVID-19, but do not rule out bacterial infection or co-infection  with other viruses. Laboratories within the [facility] and its  territories are required to report all positive results to the appropriate  public health authorities. Negative results do not preclude SARS-CoV-2  infection and should not be used as the sole basis for treatment or other  patient management decisions.  Negative results must be combined with  clinical observations, patient history, and epidemiological information. This test has not been FDA cleared or approved. This test has been authorized by FDA under an Emergency Use Authorization  (EUA). This test is only authorized for the duration of time the  declaration that circumstances exist justifying the authorization of the  emergency use of an in vitro diagnostic tests for detection of SARS-CoV-2  virus and/or diagnosis of COVID-19 infection under section 564(b)(1) of  the Act, 21 U. S.C. 734LYS-8(V)(8), unless the authorization is terminated  or revoked sooner. The test has been validated but independent review by FDA  and CLIA is pending. Test performed using [employer] GeneXpert: This RT-PCR assay targets N2,  a region unique to SARS-CoV-2. A conserved region in the E-gene was chosen  for pan-Sarbecovirus detection which includes SARS-CoV-2. According to CMS-2020-01-R, this platform meets the definition of high-throughput technology. XR chest 2 views   ED Interpretation by Mela Fajardo PA-C (09/17 8222)   No acute abnormality      Final Result by Michelle Barkley MD (09/17 1209)      No acute cardiopulmonary disease. Workstation performed: [id number]                    Procedures  Procedures         ED Course             SBIRT 22yo+    Flowsheet Row Most Recent Value   Initial Alcohol Screen: US AUDIT-C     1. How often do you have a drink containing alcohol? 0 Filed at: 09/17/2023 0854   2. How many drinks containing alcohol do you have on a typical day you are drinking? 0 Filed at: 09/17/2023 0854   3b. FEMALE Any Age, or MALE 65+: How often do you have 4 or more drinks on one occassion? 0 Filed at: 09/17/2023 0854   Audit-C Score 0 Filed at: 09/17/2023 2808   JOHNNY: How many times in the past year have you. .. Used an illegal drug or used a prescription medication for non-medical reasons?  Never Filed at: 09/17/2023 0854                    Medical Decision Making  46yoF presenting for URI symptoms for a few days. Asthma is now flaring. She is afebrile and hemodynamically stable. Oxygen saturation 98% on room air. She is well appearing in no distress. Lungs CTA and respirations non-labored. COVID/flu negative. CXR appears normal. She was given a DuoNeb with relief of symptoms. She is stable for discharge. She was started on a course of prednisone. Supportive care discussed. Advised close PCP follow-up. ED return precautions discussed. Patient expressed understanding and is agreeable to plan. Patient discharged in stable condition. Acute asthma exacerbation: acute illness or injury  Viral URI: acute illness or injury  Amount and/or Complexity of Data Reviewed  Radiology: ordered and independent interpretation performed. Risk  Prescription drug management. Disposition  Final diagnoses:   Acute asthma exacerbation   Viral URI     Time reflects when diagnosis was documented in both MDM as applicable and the Disposition within this note     Time User Action Codes Description Comment    9/17/2023 10:10 AM Bernardo Santana Add [J45.901] Acute asthma exacerbation     9/17/2023 10:10 AM Rosenda Santaan Add [J06.9] Viral URI       ED Disposition     ED Disposition   Discharge    Condition   Stable    Date/Time   Sun Sep 17, 2023  9:59 AM    Comment   [address] discharge to home/self care.                Follow-up Information     Follow up With Specialties Details Why Contact Info Additional Zane Melendez,  Family Medicine Schedule an appointment as soon as possible for a visit   [address] [address] [phone]       [facility] Emergency Department Emergency Medicine  If symptoms worsen [address] [facility] Emergency Department, [address] [employer] [facility], [address]          Discharge Medication List as of 9/17/2023 10:11 AM START taking these medications    Details   predniSONE 50 mg tablet Take 1 tablet (50 mg total) by mouth daily for 4 days Do not start before September 18, 2023., Starting Mon 9/18/2023, Until Fri 9/22/2023, Normal         CONTINUE these medications which have NOT CHANGED    Details   cyclobenzaprine (FLEXERIL) 10 mg tablet Take 1 tablet (10 mg total) by mouth 3 (three) times a day as needed for muscle spasms for up to 7 days, Starting Mon 8/26/2019, Until Mon 9/2/2019, Print      levocetirizine (XYZAL) 5 MG tablet Take 5 mg by mouth, Starting Tue 10/6/2020, Until Wed 10/6/2021, Historical Med      ondansetron (Zofran ODT) 4 mg disintegrating tablet Take 1 tablet (4 mg total) by mouth every 6 (six) hours as needed for nausea or vomiting, Starting Sat 11/26/2022, Normal             No discharge procedures on file.     PDMP Review     None          ED Provider  Electronically Signed by           Nelson Romero PA-C  09/17/23 4640

## 2023-12-28 ENCOUNTER — HOSPITAL ENCOUNTER (EMERGENCY)
Facility: HOSPITAL | Age: 38
Discharge: HOME | End: 2023-12-28
Attending: EMERGENCY MEDICINE
Payer: OTHER MISCELLANEOUS

## 2023-12-28 ENCOUNTER — APPOINTMENT (EMERGENCY)
Dept: RADIOLOGY | Facility: HOSPITAL | Age: 38
End: 2023-12-28
Payer: OTHER MISCELLANEOUS

## 2023-12-28 VITALS
TEMPERATURE: 97.7 F | DIASTOLIC BLOOD PRESSURE: 109 MMHG | SYSTOLIC BLOOD PRESSURE: 173 MMHG | HEART RATE: 99 BPM | OXYGEN SATURATION: 99 % | RESPIRATION RATE: 18 BRPM

## 2023-12-28 DIAGNOSIS — S46.009A ROTATOR CUFF INJURY, INITIAL ENCOUNTER: ICD-10-CM

## 2023-12-28 DIAGNOSIS — S42.292A HUMERUS HEAD FRACTURE, LEFT, CLOSED, INITIAL ENCOUNTER: Primary | ICD-10-CM

## 2023-12-28 PROCEDURE — 99283 EMERGENCY DEPT VISIT LOW MDM: CPT

## 2023-12-28 PROCEDURE — 73030 X-RAY EXAM OF SHOULDER: CPT

## 2023-12-28 PROCEDURE — 99285 EMERGENCY DEPT VISIT HI MDM: CPT | Performed by: EMERGENCY MEDICINE

## 2023-12-28 PROCEDURE — 96372 THER/PROPH/DIAG INJ SC/IM: CPT

## 2023-12-28 RX ORDER — KETOROLAC TROMETHAMINE 10 MG/1
10 TABLET, FILM COATED ORAL ONCE
Status: COMPLETED | OUTPATIENT
Start: 2023-12-28 | End: 2023-12-28

## 2023-12-28 RX ORDER — IBUPROFEN 800 MG/1
800 TABLET ORAL 3 TIMES DAILY
Qty: 21 TABLET | Refills: 0 | Status: SHIPPED | OUTPATIENT
Start: 2023-12-28

## 2023-12-28 RX ORDER — MORPHINE SULFATE 4 MG/ML
4 INJECTION, SOLUTION INTRAMUSCULAR; INTRAVENOUS ONCE
Status: COMPLETED | OUTPATIENT
Start: 2023-12-28 | End: 2023-12-28

## 2023-12-28 RX ORDER — OXYCODONE HYDROCHLORIDE 5 MG/1
5 TABLET ORAL EVERY 4 HOURS PRN
Qty: 25 TABLET | Refills: 0 | Status: SHIPPED | OUTPATIENT
Start: 2023-12-28 | End: 2024-01-07

## 2023-12-28 RX ADMIN — MORPHINE SULFATE 4 MG: 4 INJECTION INTRAVENOUS at 13:40

## 2023-12-28 RX ADMIN — KETOROLAC TROMETHAMINE 10 MG: 10 TABLET, FILM COATED ORAL at 13:40

## 2023-12-28 NOTE — ED PROVIDER NOTES
History  Chief Complaint   Patient presents with    Shoulder Injury     L sided shoulder inj after a fall      Gavi Hernández is a 38 y.o.  year old female  Past Medical History:  No date: Asthma  No date: Sleep apnea  Social History    Tobacco Use      Smoking status: Never      Smokeless tobacco: Never    Vaping Use      Vaping status: Never Used    Alcohol use: Yes      Comment: socially    Drug use: Never    Patient presents with:  Shoulder Injury: L sided shoulder inj after a fall   Fell backwards and L arm pulled all the way back  Severe pain, sharp, constant  No other injuries  Injury happened at work    History obtained directly from the PATIENT              History provided by:  Patient   used: No    Shoulder Injury  Location:  Shoulder  Shoulder location:  L shoulder  Injury: yes    Associated symptoms: no back pain and no fever        Prior to Admission Medications   Prescriptions Last Dose Informant Patient Reported? Taking?   cyclobenzaprine (FLEXERIL) 10 mg tablet   No No   Sig: Take 1 tablet (10 mg total) by mouth 3 (three) times a day as needed for muscle spasms for up to 7 days   levocetirizine (XYZAL) 5 MG tablet  Self Yes No   Sig: Take 5 mg by mouth   ondansetron (Zofran ODT) 4 mg disintegrating tablet   No No   Sig: Take 1 tablet (4 mg total) by mouth every 6 (six) hours as needed for nausea or vomiting      Facility-Administered Medications: None       Past Medical History:   Diagnosis Date    Asthma     Sleep apnea        Past Surgical History:   Procedure Laterality Date    CHOLECYSTECTOMY      GASTRECTOMY SLEEVE LAPAROSCOPIC         Family History   Problem Relation Age of Onset    Heart disease Mother      I have reviewed and agree with the history as documented.    E-Cigarette/Vaping    E-Cigarette Use Never User      E-Cigarette/Vaping Substances    Nicotine No     THC No     CBD No     Flavoring No     Other No     Unknown No      Social History     Tobacco Use     Smoking status: Never    Smokeless tobacco: Never   Vaping Use    Vaping status: Never Used   Substance Use Topics    Alcohol use: Yes     Comment: socially    Drug use: Never       Review of Systems   Constitutional:  Negative for chills and fever.   HENT:  Negative for ear pain and sore throat.    Eyes:  Negative for pain and visual disturbance.   Respiratory:  Negative for cough and shortness of breath.    Cardiovascular:  Negative for chest pain and palpitations.   Gastrointestinal:  Negative for abdominal pain and vomiting.   Genitourinary:  Negative for dysuria and hematuria.   Musculoskeletal:  Negative for arthralgias and back pain.   Skin:  Negative for color change and rash.   Neurological:  Negative for seizures and syncope.   All other systems reviewed and are negative.      Physical Exam  Physical Exam  Vitals and nursing note reviewed.   Constitutional:       General: She is not in acute distress.     Appearance: Normal appearance. She is well-developed. She is obese.   HENT:      Head: Normocephalic and atraumatic.      Right Ear: External ear normal.      Left Ear: External ear normal.      Mouth/Throat:      Mouth: Mucous membranes are moist.   Eyes:      Conjunctiva/sclera: Conjunctivae normal.      Pupils: Pupils are equal, round, and reactive to light.   Cardiovascular:      Rate and Rhythm: Normal rate and regular rhythm.      Heart sounds: No murmur heard.  Pulmonary:      Effort: Pulmonary effort is normal. No respiratory distress.      Breath sounds: Normal breath sounds.   Abdominal:      Palpations: Abdomen is soft.      Tenderness: There is no abdominal tenderness.   Musculoskeletal:         General: No swelling.      Cervical back: Neck supple.   Skin:     General: Skin is warm and dry.      Capillary Refill: Capillary refill takes less than 2 seconds.   Neurological:      General: No focal deficit present.      Mental Status: She is alert and oriented to person, place, and time.    Psychiatric:         Mood and Affect: Mood normal.         Thought Content: Thought content normal.         Judgment: Judgment normal.         Vital Signs  ED Triage Vitals   Temperature Pulse Respirations Blood Pressure SpO2   12/28/23 1245 12/28/23 1245 12/28/23 1245 12/28/23 1245 12/28/23 1245   97.7 °F (36.5 °C) 99 18 (!) 173/109 99 %      Temp src Heart Rate Source Patient Position - Orthostatic VS BP Location FiO2 (%)   -- -- -- -- --             Pain Score       12/28/23 1340       10 - Worst Possible Pain           Vitals:    12/28/23 1245   BP: (!) 173/109   Pulse: 99         Visual Acuity      ED Medications  Medications   morphine injection 4 mg (4 mg Intramuscular Given 12/28/23 1340)   ketorolac (TORADOL) tablet 10 mg (10 mg Oral Given 12/28/23 1340)       Diagnostic Studies  Results Reviewed       None                   XR shoulder 2+ views LEFT   ED Interpretation by Aquiles Alvarez MD (12/28 3199)   + fracture, ? Rotator cuff injury (increased space joint)      Final Result by Micki Fu MD (12/28 6902)      Acute, impacted, angulated fracture of the left humeral neck.      Incidentally noted is a thin, white, somewhat discontinuous curvilinear density overlying the left lung apex which is of uncertain etiology. This cannot be traced beyond the level of the fourth rib and is unlikely to represent a pleural reflection    suggesting pneumothorax. This is not present on most recent chest radiograph dated 9/17/2023. Possibly, this represents something external to the patient. If the patient demonstrates pulmonary symptoms, dedicated chest imaging would be advised.      The study was marked in EPIC for immediate notification.         Workstation performed: [id number]                    Procedures  Procedures         ED Course                               SBIRT 20yo+      Flowsheet Row Most Recent Value   Initial Alcohol Screen: US AUDIT-C     1. How often do you have a drink containing alcohol? 0  Filed at: 12/28/2023 1247   2. How many drinks containing alcohol do you have on a typical day you are drinking?  0 Filed at: 12/28/2023 1247   3a. Male UNDER 65: How often do you have five or more drinks on one occasion? 0 Filed at: 12/28/2023 1247   3b. FEMALE Any Age, or MALE 65+: How often do you have 4 or more drinks on one occassion? 0 Filed at: 12/28/2023 1247   Audit-C Score 0 Filed at: 12/28/2023 1247   JOHNNY: How many times in the past year have you...    Used an illegal drug or used a prescription medication for non-medical reasons? Never Filed at: 12/28/2023 1247                      Medical Decision Making  This patient presented to emergency department for injury to his extremity.  Differential diagnosis includes but not limited to: Sprain, strain, fracture, dislocation, subluxation, soft tissue contusion/hematoma.       Problems Addressed:  Humerus head fracture, left, closed, initial encounter: acute illness or injury    Amount and/or Complexity of Data Reviewed  Radiology: ordered and independent interpretation performed.  Discussion of management or test interpretation with external provider(s): Patient clinical presentation is benign.    Meaning patient's vital signs are normal and stable ED Triage Vitals [12/28/23 1245]  Temperature: 97.7 °F (36.5 °C)  Pulse: 99  Respirations: 18  Blood Pressure: (!) 173/109  SpO2: 99 %  Temp src: n/a  Heart Rate Source: n/a  Patient Position - Orthostatic VS: n/a  BP Location: n/a  FiO2 (%): n/a  Pain Score: n/a.    Patient in no distress.    Chief complaint, vital signs, physical examination does not suggest an acute medical emergency at this time.       Risk  OTC drugs.  Prescription drug management.             Disposition  Final diagnoses:   Humerus head fracture, left, closed, initial encounter   Rotator cuff injury, initial encounter     Time reflects when diagnosis was documented in both MDM as applicable and the Disposition within this note       Time  User Action Codes Description Comment    12/28/2023  1:37 PM Aquiles Alvarez [S42.292A] Humerus head fracture, left, closed, initial encounter     12/28/2023  1:38 PM Aquiles Alvarez [S46.009A] Rotator cuff injury, initial encounter           ED Disposition       ED Disposition   Discharge    Condition   Stable    Date/Time   Thu Dec 28, 2023 1338    Comment   Gavi Hernández discharge to home/self care.                   Follow-up Information       Follow up With Specialties Details Why Contact Info        report injury to [place] comp for follow up instructions            Discharge Medication List as of 12/28/2023  1:41 PM        START taking these medications    Details   ibuprofen (MOTRIN) 800 mg tablet Take 1 tablet (800 mg total) by mouth 3 (three) times a day, Starting Thu 12/28/2023, Normal      oxyCODONE (Roxicodone) 5 immediate release tablet Take 1 tablet (5 mg total) by mouth every 4 (four) hours as needed for moderate pain or severe pain for up to 10 days Max Daily Amount: 30 mg, Starting Thu 12/28/2023, Until Sun 1/7/2024 at 2359, Normal           CONTINUE these medications which have NOT CHANGED    Details   cyclobenzaprine (FLEXERIL) 10 mg tablet Take 1 tablet (10 mg total) by mouth 3 (three) times a day as needed for muscle spasms for up to 7 days, Starting Mon 8/26/2019, Until Mon 9/2/2019, Print      levocetirizine (XYZAL) 5 MG tablet Take 5 mg by mouth, Starting Tue 10/6/2020, Until Wed 10/6/2021, Historical Med      ondansetron (Zofran ODT) 4 mg disintegrating tablet Take 1 tablet (4 mg total) by mouth every 6 (six) hours as needed for nausea or vomiting, Starting Sat 11/26/2022, Normal             No discharge procedures on file.    PDMP Review       None            ED Provider  Electronically Signed by             Aquiles Alvarez MD  12/28/23 1955

## 2023-12-28 NOTE — DISCHARGE INSTRUCTIONS
A  personal message from Dr. Aquiles Alvarez,  Thank you so much for allowing me to care for you today.    I pride myself in the care and attention I give all my patients.  I hope you were a witness to this tonight.   If for any reason your condition does not improve or worsens, or you have a question that was not answered during your visit you can feel free to text me on my personal phone #  # [phone].   I will answer to your message and continue your care past your emergency room visit.     Please understand that although you are being discharged because your condition has been deemed stable and able to be managed on an outpatient setting. However your condition may worsen as part of the natural progression of the illness/condition, if this occurs please come back to the emergency department for a repeat evaluation.

## 2023-12-29 NOTE — RESULT ENCOUNTER NOTE
2nd unsuccessful attempt made. Call went straight to voicemail and message left. I also called patient's emergency contact and left a voicemail.